# Patient Record
Sex: FEMALE | Race: BLACK OR AFRICAN AMERICAN | Employment: FULL TIME | URBAN - METROPOLITAN AREA
[De-identification: names, ages, dates, MRNs, and addresses within clinical notes are randomized per-mention and may not be internally consistent; named-entity substitution may affect disease eponyms.]

---

## 2017-06-26 ENCOUNTER — OP HISTORICAL/CONVERTED ENCOUNTER (OUTPATIENT)
Dept: OTHER | Age: 32
End: 2017-06-26

## 2018-02-09 ENCOUNTER — OP HISTORICAL/CONVERTED ENCOUNTER (OUTPATIENT)
Dept: OTHER | Age: 33
End: 2018-02-09

## 2021-06-16 RX ORDER — IBUPROFEN 800 MG/1
800 TABLET ORAL
COMMUNITY
End: 2021-07-16

## 2021-06-16 RX ORDER — VALACYCLOVIR HYDROCHLORIDE 500 MG/1
500 TABLET, FILM COATED ORAL DAILY
COMMUNITY

## 2021-06-16 RX ORDER — GABAPENTIN 100 MG/1
100 CAPSULE ORAL AS NEEDED
COMMUNITY

## 2021-06-16 RX ORDER — CETIRIZINE HCL 10 MG
10 TABLET ORAL
COMMUNITY

## 2021-06-16 RX ORDER — NAPROXEN SODIUM 220 MG
220 TABLET ORAL
COMMUNITY

## 2021-06-16 RX ORDER — MONTELUKAST SODIUM 10 MG/1
10 TABLET ORAL EVERY EVENING
COMMUNITY

## 2021-06-16 NOTE — PERIOP NOTES
[address] , [address] [facility]   PRE-ADMISSION TESTING    [phone]     Surgery Date:  7/14/2021      Is surgery arrival time given by surgeon? NO  If NO, [facility] staff will call you between 3 and 7pm the day before your surgery with your arrival time. (If your surgery is on a Monday, we will call you the Friday before.)    Call [phone] after 7pm Monday-Friday if you did not receive this call. INSTRUCTIONS BEFORE YOUR SURGERY   When You  Arrive Arrive at the [address]nd [address] [facility] on the day of your surgery  Have your insurance card, photo ID, and any copayment (if needed)   Food   and   Drink NO food or drink after midnight the night before surgery    This means NO water, gum, mints, coffee, juice, etc.  No alcohol (beer, wine, liquor) 24 hours before and after surgery   Medications to   TAKE   Morning of Surgery MEDICATIONS TO TAKE THE MORNING OF SURGERY WITH A SIP OF WATER:    Zyrtec   Valtrex   Gabapentin if needed     Medications  To  STOP      7 days before surgery  Non-Steroidal anti-inflammatory Drugs (NSAID's): for example, Ibuprofen (Advil, Motrin), Naproxen (Aleve)   Aspirin, if taking for pain    Herbal supplements, vitamins, and fish oil   Other:  (Pain medications not listed above, including Tylenol may be taken)   Blood  Thinners  If you take  Aspirin, Plavix, Coumadin, or any blood-thinning or anti-blood clot medicine, talk to the doctor who prescribed the medications for pre-operative instructions. Bathing Clothing  Jewelry  Valuables      If you shower the morning of surgery, please do not apply anything to your skin (lotions, powders, deodorant, or makeup, especially mascara)   Follow Chlorhexidine Care Fusion body wash instructions provided to you during PAT appointment. Begin 3 days prior to surgery.    Do not shave or trim anywhere 24 hours before surgery   Wear your hair loose or down; no pony-tails, buns, or metal hair clips   Wear loose, comfortable, clean clothes   Wear glasses instead of contacts  [address], valuables, and jewelry, including body piercings, at home   If you were given an [employer], bring it on day of surgery. Going Home - or Spending the Night  SAME-DAY SURGERY: You must have a responsible adult drive you home and stay with you 24 hours after surgery   ADMITS: If your doctor is keeping you in the hospital after surgery, leave personal belongings/luggage in your car until you have a hospital room number. Hospital discharge time is 12 noon  Drivers must be here before 12 noon unless you are told differently   Special Instructions No covid testing needed; please bring your vaccination card with you to the hospital.       Follow all instructions so your surgery wont be cancelled. Please, be on time. If a situation occurs and you are delayed the day of surgery, call [phone]. If your physical condition changes (like a fever, cold, flu, etc.) call your surgeon. Home medication(s) reviewed and verified verbally via phone during PAT appointment. The patient was contacted in person. The patient verbalizes understanding of all instructions and does not need reinforcement.

## 2021-07-13 ENCOUNTER — ANESTHESIA EVENT (OUTPATIENT)
Dept: SURGERY | Age: 36
DRG: 354 | End: 2021-07-13
Payer: COMMERCIAL

## 2021-07-14 ENCOUNTER — HOSPITAL ENCOUNTER (OUTPATIENT)
Age: 36
Setting detail: OBSERVATION
Discharge: HOME | DRG: 354 | End: 2021-07-16
Attending: STUDENT IN AN ORGANIZED HEALTH CARE EDUCATION/TRAINING PROGRAM | Admitting: STUDENT IN AN ORGANIZED HEALTH CARE EDUCATION/TRAINING PROGRAM
Payer: COMMERCIAL

## 2021-07-14 ENCOUNTER — ANESTHESIA (OUTPATIENT)
Dept: SURGERY | Age: 36
DRG: 354 | End: 2021-07-14
Payer: COMMERCIAL

## 2021-07-14 DIAGNOSIS — L76.82 PAIN AT SURGICAL INCISION: Primary | ICD-10-CM

## 2021-07-14 PROBLEM — D25.9 FIBROID UTERUS: Status: ACTIVE | Noted: 2021-07-14

## 2021-07-14 LAB
ABO + RH BLD: NORMAL
ANION GAP SERPL CALC-SCNC: 5 MMOL/L (ref 5–15)
BLOOD GROUP ANTIBODIES SERPL: NORMAL
BUN SERPL-MCNC: 19 MG/DL (ref 6–20)
BUN/CREAT SERPL: 30 (ref 12–20)
CALCIUM SERPL-MCNC: 8.3 MG/DL (ref 8.5–10.1)
CHLORIDE SERPL-SCNC: 110 MMOL/L (ref 97–108)
CO2 SERPL-SCNC: 25 MMOL/L (ref 21–32)
CREAT SERPL-MCNC: 0.64 MG/DL (ref 0.55–1.02)
ERYTHROCYTE [DISTWIDTH] IN BLOOD BY AUTOMATED COUNT: 13.3 % (ref 11.5–14.5)
GLUCOSE SERPL-MCNC: 103 MG/DL (ref 65–100)
HCG UR QL: NEGATIVE
HCT VFR BLD AUTO: 32.6 % (ref 35–47)
HGB BLD-MCNC: 10.2 G/DL (ref 11.5–16)
MCH RBC QN AUTO: 27.4 PG (ref 26–34)
MCHC RBC AUTO-ENTMCNC: 31.3 G/DL (ref 30–36.5)
MCV RBC AUTO: 87.6 FL (ref 80–99)
NRBC # BLD: 0 K/UL (ref 0–0.01)
NRBC BLD-RTO: 0 PER 100 WBC
PLATELET # BLD AUTO: 323 K/UL (ref 150–400)
PMV BLD AUTO: 9.5 FL (ref 8.9–12.9)
POTASSIUM SERPL-SCNC: 3.9 MMOL/L (ref 3.5–5.1)
RBC # BLD AUTO: 3.72 M/UL (ref 3.8–5.2)
SODIUM SERPL-SCNC: 140 MMOL/L (ref 136–145)
SPECIMEN EXP DATE BLD: NORMAL
WBC # BLD AUTO: 8.5 K/UL (ref 3.6–11)

## 2021-07-14 PROCEDURE — 77030031139 HC SUT VCRL2 J&J -A: Performed by: STUDENT IN AN ORGANIZED HEALTH CARE EDUCATION/TRAINING PROGRAM

## 2021-07-14 PROCEDURE — 74011000250 HC RX REV CODE- 250: Performed by: ANESTHESIOLOGY

## 2021-07-14 PROCEDURE — 76210000032 HC AMBSU PH I REC 3 TO 3.5 HR: Performed by: STUDENT IN AN ORGANIZED HEALTH CARE EDUCATION/TRAINING PROGRAM

## 2021-07-14 PROCEDURE — 74011250637 HC RX REV CODE- 250/637: Performed by: STUDENT IN AN ORGANIZED HEALTH CARE EDUCATION/TRAINING PROGRAM

## 2021-07-14 PROCEDURE — 77030035236 HC SUT PDS STRATFX BARB J&J -B: Performed by: STUDENT IN AN ORGANIZED HEALTH CARE EDUCATION/TRAINING PROGRAM

## 2021-07-14 PROCEDURE — 77030002974 HC SUT PLN J&J -A: Performed by: STUDENT IN AN ORGANIZED HEALTH CARE EDUCATION/TRAINING PROGRAM

## 2021-07-14 PROCEDURE — 99218 HC RM OBSERVATION: CPT

## 2021-07-14 PROCEDURE — 77030003601 HC NDL NRV BLK BBMI -A: Performed by: ANESTHESIOLOGY

## 2021-07-14 PROCEDURE — 36415 COLL VENOUS BLD VENIPUNCTURE: CPT

## 2021-07-14 PROCEDURE — 74011250636 HC RX REV CODE- 250/636: Performed by: STUDENT IN AN ORGANIZED HEALTH CARE EDUCATION/TRAINING PROGRAM

## 2021-07-14 PROCEDURE — 77030005513 HC CATH URETH FOL11 MDII -B: Performed by: STUDENT IN AN ORGANIZED HEALTH CARE EDUCATION/TRAINING PROGRAM

## 2021-07-14 PROCEDURE — 80048 BASIC METABOLIC PNL TOTAL CA: CPT

## 2021-07-14 PROCEDURE — 86901 BLOOD TYPING SEROLOGIC RH(D): CPT

## 2021-07-14 PROCEDURE — 2709999900 HC NON-CHARGEABLE SUPPLY: Performed by: STUDENT IN AN ORGANIZED HEALTH CARE EDUCATION/TRAINING PROGRAM

## 2021-07-14 PROCEDURE — 74011000272 HC RX REV CODE- 272: Performed by: STUDENT IN AN ORGANIZED HEALTH CARE EDUCATION/TRAINING PROGRAM

## 2021-07-14 PROCEDURE — 74011000258 HC RX REV CODE- 258: Performed by: STUDENT IN AN ORGANIZED HEALTH CARE EDUCATION/TRAINING PROGRAM

## 2021-07-14 PROCEDURE — 76030000004 HC AMB SURG OR TIME 2 TO 2.5: Performed by: STUDENT IN AN ORGANIZED HEALTH CARE EDUCATION/TRAINING PROGRAM

## 2021-07-14 PROCEDURE — 74011250636 HC RX REV CODE- 250/636: Performed by: ANESTHESIOLOGY

## 2021-07-14 PROCEDURE — 76060000064 HC AMB SURG ANES 2 TO 2.5 HR: Performed by: STUDENT IN AN ORGANIZED HEALTH CARE EDUCATION/TRAINING PROGRAM

## 2021-07-14 PROCEDURE — 81025 URINE PREGNANCY TEST: CPT

## 2021-07-14 PROCEDURE — 77030002933 HC SUT MCRYL J&J -A: Performed by: STUDENT IN AN ORGANIZED HEALTH CARE EDUCATION/TRAINING PROGRAM

## 2021-07-14 PROCEDURE — 74011000258 HC RX REV CODE- 258: Performed by: ANESTHESIOLOGY

## 2021-07-14 PROCEDURE — C1765 ADHESION BARRIER: HCPCS | Performed by: STUDENT IN AN ORGANIZED HEALTH CARE EDUCATION/TRAINING PROGRAM

## 2021-07-14 PROCEDURE — 85027 COMPLETE CBC AUTOMATED: CPT

## 2021-07-14 PROCEDURE — 88305 TISSUE EXAM BY PATHOLOGIST: CPT

## 2021-07-14 PROCEDURE — 77030027138 HC INCENT SPIROMETER -A

## 2021-07-14 RX ORDER — PROPOFOL 10 MG/ML
INJECTION, EMULSION INTRAVENOUS AS NEEDED
Status: DISCONTINUED | OUTPATIENT
Start: 2021-07-14 | End: 2021-07-14 | Stop reason: HOSPADM

## 2021-07-14 RX ORDER — HYDROMORPHONE HYDROCHLORIDE 1 MG/ML
1 INJECTION, SOLUTION INTRAMUSCULAR; INTRAVENOUS; SUBCUTANEOUS
Status: DISCONTINUED | OUTPATIENT
Start: 2021-07-14 | End: 2021-07-16 | Stop reason: HOSPADM

## 2021-07-14 RX ORDER — ROPIVACAINE HYDROCHLORIDE 2 MG/ML
INJECTION, SOLUTION EPIDURAL; INFILTRATION; PERINEURAL AS NEEDED
Status: DISCONTINUED | OUTPATIENT
Start: 2021-07-14 | End: 2021-07-14 | Stop reason: HOSPADM

## 2021-07-14 RX ORDER — DEXAMETHASONE SODIUM PHOSPHATE 4 MG/ML
INJECTION, SOLUTION INTRA-ARTICULAR; INTRALESIONAL; INTRAMUSCULAR; INTRAVENOUS; SOFT TISSUE AS NEEDED
Status: DISCONTINUED | OUTPATIENT
Start: 2021-07-14 | End: 2021-07-14 | Stop reason: HOSPADM

## 2021-07-14 RX ORDER — SODIUM CHLORIDE 0.9 % (FLUSH) 0.9 %
5-40 SYRINGE (ML) INJECTION EVERY 8 HOURS
Status: DISCONTINUED | OUTPATIENT
Start: 2021-07-14 | End: 2021-07-14 | Stop reason: HOSPADM

## 2021-07-14 RX ORDER — CLINDAMYCIN PHOSPHATE 900 MG/50ML
900 INJECTION, SOLUTION INTRAVENOUS ONCE
Status: COMPLETED | OUTPATIENT
Start: 2021-07-14 | End: 2021-07-14

## 2021-07-14 RX ORDER — KETOROLAC TROMETHAMINE 30 MG/ML
30 INJECTION, SOLUTION INTRAMUSCULAR; INTRAVENOUS EVERY 6 HOURS
Status: DISCONTINUED | OUTPATIENT
Start: 2021-07-14 | End: 2021-07-15

## 2021-07-14 RX ORDER — GLYCOPYRROLATE 0.2 MG/ML
INJECTION INTRAMUSCULAR; INTRAVENOUS AS NEEDED
Status: DISCONTINUED | OUTPATIENT
Start: 2021-07-14 | End: 2021-07-14 | Stop reason: HOSPADM

## 2021-07-14 RX ORDER — LIDOCAINE HYDROCHLORIDE 10 MG/ML
0.1 INJECTION, SOLUTION EPIDURAL; INFILTRATION; INTRACAUDAL; PERINEURAL AS NEEDED
Status: DISCONTINUED | OUTPATIENT
Start: 2021-07-14 | End: 2021-07-14 | Stop reason: HOSPADM

## 2021-07-14 RX ORDER — ROCURONIUM BROMIDE 10 MG/ML
INJECTION, SOLUTION INTRAVENOUS AS NEEDED
Status: DISCONTINUED | OUTPATIENT
Start: 2021-07-14 | End: 2021-07-14 | Stop reason: HOSPADM

## 2021-07-14 RX ORDER — FENTANYL CITRATE 50 UG/ML
INJECTION, SOLUTION INTRAMUSCULAR; INTRAVENOUS AS NEEDED
Status: DISCONTINUED | OUTPATIENT
Start: 2021-07-14 | End: 2021-07-14 | Stop reason: HOSPADM

## 2021-07-14 RX ORDER — SODIUM CHLORIDE, SODIUM LACTATE, POTASSIUM CHLORIDE, CALCIUM CHLORIDE 600; 310; 30; 20 MG/100ML; MG/100ML; MG/100ML; MG/100ML
125 INJECTION, SOLUTION INTRAVENOUS CONTINUOUS
Status: DISCONTINUED | OUTPATIENT
Start: 2021-07-14 | End: 2021-07-14 | Stop reason: HOSPADM

## 2021-07-14 RX ORDER — DIPHENHYDRAMINE HYDROCHLORIDE 50 MG/ML
12.5 INJECTION, SOLUTION INTRAMUSCULAR; INTRAVENOUS
Status: DISCONTINUED | OUTPATIENT
Start: 2021-07-14 | End: 2021-07-16 | Stop reason: HOSPADM

## 2021-07-14 RX ORDER — SODIUM CHLORIDE, SODIUM LACTATE, POTASSIUM CHLORIDE, CALCIUM CHLORIDE 600; 310; 30; 20 MG/100ML; MG/100ML; MG/100ML; MG/100ML
125 INJECTION, SOLUTION INTRAVENOUS CONTINUOUS
Status: DISCONTINUED | OUTPATIENT
Start: 2021-07-14 | End: 2021-07-15

## 2021-07-14 RX ORDER — ONDANSETRON 2 MG/ML
4 INJECTION INTRAMUSCULAR; INTRAVENOUS AS NEEDED
Status: DISCONTINUED | OUTPATIENT
Start: 2021-07-14 | End: 2021-07-14 | Stop reason: HOSPADM

## 2021-07-14 RX ORDER — HYDROMORPHONE HYDROCHLORIDE 1 MG/ML
.5-1 INJECTION, SOLUTION INTRAMUSCULAR; INTRAVENOUS; SUBCUTANEOUS
Status: DISCONTINUED | OUTPATIENT
Start: 2021-07-14 | End: 2021-07-14 | Stop reason: HOSPADM

## 2021-07-14 RX ORDER — NEOSTIGMINE METHYLSULFATE 1 MG/ML
INJECTION, SOLUTION INTRAVENOUS AS NEEDED
Status: DISCONTINUED | OUTPATIENT
Start: 2021-07-14 | End: 2021-07-14 | Stop reason: HOSPADM

## 2021-07-14 RX ORDER — KETOROLAC TROMETHAMINE 30 MG/ML
INJECTION, SOLUTION INTRAMUSCULAR; INTRAVENOUS AS NEEDED
Status: DISCONTINUED | OUTPATIENT
Start: 2021-07-14 | End: 2021-07-14 | Stop reason: HOSPADM

## 2021-07-14 RX ORDER — DOCUSATE SODIUM 100 MG/1
100 CAPSULE, LIQUID FILLED ORAL 2 TIMES DAILY
Status: DISCONTINUED | OUTPATIENT
Start: 2021-07-14 | End: 2021-07-16 | Stop reason: HOSPADM

## 2021-07-14 RX ORDER — TRANEXAMIC ACID 100 MG/ML
1000 INJECTION, SOLUTION INTRAVENOUS ONCE
Status: DISCONTINUED | OUTPATIENT
Start: 2021-07-14 | End: 2021-07-14 | Stop reason: HOSPADM

## 2021-07-14 RX ORDER — ONDANSETRON 2 MG/ML
4 INJECTION INTRAMUSCULAR; INTRAVENOUS
Status: DISCONTINUED | OUTPATIENT
Start: 2021-07-14 | End: 2021-07-16 | Stop reason: HOSPADM

## 2021-07-14 RX ORDER — LIDOCAINE HYDROCHLORIDE 20 MG/ML
INJECTION, SOLUTION EPIDURAL; INFILTRATION; INTRACAUDAL; PERINEURAL AS NEEDED
Status: DISCONTINUED | OUTPATIENT
Start: 2021-07-14 | End: 2021-07-14 | Stop reason: HOSPADM

## 2021-07-14 RX ORDER — SODIUM CHLORIDE 0.9 % (FLUSH) 0.9 %
5-40 SYRINGE (ML) INJECTION AS NEEDED
Status: DISCONTINUED | OUTPATIENT
Start: 2021-07-14 | End: 2021-07-14 | Stop reason: HOSPADM

## 2021-07-14 RX ORDER — OXYCODONE AND ACETAMINOPHEN 5; 325 MG/1; MG/1
1 TABLET ORAL
Status: DISCONTINUED | OUTPATIENT
Start: 2021-07-14 | End: 2021-07-16 | Stop reason: HOSPADM

## 2021-07-14 RX ORDER — MIDAZOLAM HYDROCHLORIDE 1 MG/ML
INJECTION, SOLUTION INTRAMUSCULAR; INTRAVENOUS AS NEEDED
Status: DISCONTINUED | OUTPATIENT
Start: 2021-07-14 | End: 2021-07-14 | Stop reason: HOSPADM

## 2021-07-14 RX ORDER — SODIUM CHLORIDE 0.9 % (FLUSH) 0.9 %
5-40 SYRINGE (ML) INJECTION EVERY 8 HOURS
Status: DISCONTINUED | OUTPATIENT
Start: 2021-07-14 | End: 2021-07-16 | Stop reason: HOSPADM

## 2021-07-14 RX ORDER — SODIUM CHLORIDE 0.9 % (FLUSH) 0.9 %
5-40 SYRINGE (ML) INJECTION AS NEEDED
Status: DISCONTINUED | OUTPATIENT
Start: 2021-07-14 | End: 2021-07-16 | Stop reason: HOSPADM

## 2021-07-14 RX ORDER — ONDANSETRON 2 MG/ML
INJECTION INTRAMUSCULAR; INTRAVENOUS AS NEEDED
Status: DISCONTINUED | OUTPATIENT
Start: 2021-07-14 | End: 2021-07-14 | Stop reason: HOSPADM

## 2021-07-14 RX ADMIN — FENTANYL CITRATE 100 MCG: 50 INJECTION, SOLUTION INTRAMUSCULAR; INTRAVENOUS at 08:43

## 2021-07-14 RX ADMIN — CLINDAMYCIN PHOSPHATE 900 MG: 900 INJECTION, SOLUTION INTRAVENOUS at 08:54

## 2021-07-14 RX ADMIN — Medication 10 ML: at 21:29

## 2021-07-14 RX ADMIN — LIDOCAINE HYDROCHLORIDE 100 MG: 20 INJECTION, SOLUTION INTRAVENOUS at 08:43

## 2021-07-14 RX ADMIN — SODIUM CHLORIDE, POTASSIUM CHLORIDE, SODIUM LACTATE AND CALCIUM CHLORIDE: 600; 310; 30; 20 INJECTION, SOLUTION INTRAVENOUS at 09:26

## 2021-07-14 RX ADMIN — HYDROMORPHONE HYDROCHLORIDE 1 MG: 1 INJECTION, SOLUTION INTRAMUSCULAR; INTRAVENOUS; SUBCUTANEOUS at 17:01

## 2021-07-14 RX ADMIN — ROPIVACAINE HYDROCHLORIDE 20 ML: 2 INJECTION EPIDURAL; INFILTRATION at 10:13

## 2021-07-14 RX ADMIN — KETOROLAC TROMETHAMINE 30 MG: 30 INJECTION, SOLUTION INTRAMUSCULAR; INTRAVENOUS at 09:53

## 2021-07-14 RX ADMIN — HYDROMORPHONE HYDROCHLORIDE 1 MG: 1 INJECTION, SOLUTION INTRAMUSCULAR; INTRAVENOUS; SUBCUTANEOUS at 12:39

## 2021-07-14 RX ADMIN — FENTANYL CITRATE 50 MCG: 50 INJECTION, SOLUTION INTRAMUSCULAR; INTRAVENOUS at 08:58

## 2021-07-14 RX ADMIN — KETOROLAC TROMETHAMINE 30 MG: 30 INJECTION, SOLUTION INTRAMUSCULAR; INTRAVENOUS at 23:47

## 2021-07-14 RX ADMIN — FENTANYL CITRATE 50 MCG: 50 INJECTION, SOLUTION INTRAMUSCULAR; INTRAVENOUS at 09:55

## 2021-07-14 RX ADMIN — PROPOFOL 150 MCG/KG/MIN: 10 INJECTION, EMULSION INTRAVENOUS at 08:50

## 2021-07-14 RX ADMIN — DOCUSATE SODIUM 100 MG: 100 CAPSULE ORAL at 17:01

## 2021-07-14 RX ADMIN — HYDROMORPHONE HYDROCHLORIDE 1 MG: 1 INJECTION, SOLUTION INTRAMUSCULAR; INTRAVENOUS; SUBCUTANEOUS at 21:29

## 2021-07-14 RX ADMIN — DEXAMETHASONE SODIUM PHOSPHATE 8 MG: 4 INJECTION, SOLUTION INTRAMUSCULAR; INTRAVENOUS at 08:54

## 2021-07-14 RX ADMIN — SODIUM CHLORIDE, POTASSIUM CHLORIDE, SODIUM LACTATE AND CALCIUM CHLORIDE 125 ML/HR: 600; 310; 30; 20 INJECTION, SOLUTION INTRAVENOUS at 14:54

## 2021-07-14 RX ADMIN — GLYCOPYRROLATE 1 MG: 0.2 INJECTION INTRAMUSCULAR; INTRAVENOUS at 10:06

## 2021-07-14 RX ADMIN — ROCURONIUM BROMIDE 50 MG: 10 INJECTION INTRAVENOUS at 08:43

## 2021-07-14 RX ADMIN — SODIUM CHLORIDE, POTASSIUM CHLORIDE, SODIUM LACTATE AND CALCIUM CHLORIDE: 600; 310; 30; 20 INJECTION, SOLUTION INTRAVENOUS at 10:28

## 2021-07-14 RX ADMIN — KETOROLAC TROMETHAMINE 30 MG: 30 INJECTION, SOLUTION INTRAMUSCULAR; INTRAVENOUS at 17:01

## 2021-07-14 RX ADMIN — HYDROMORPHONE HYDROCHLORIDE 0.5 MG: 1 INJECTION, SOLUTION INTRAMUSCULAR; INTRAVENOUS; SUBCUTANEOUS at 11:05

## 2021-07-14 RX ADMIN — GENTAMICIN SULFATE 400 MG: 40 INJECTION, SOLUTION INTRAMUSCULAR; INTRAVENOUS at 08:01

## 2021-07-14 RX ADMIN — HYDROMORPHONE HYDROCHLORIDE 0.5 MG: 1 INJECTION, SOLUTION INTRAMUSCULAR; INTRAVENOUS; SUBCUTANEOUS at 11:28

## 2021-07-14 RX ADMIN — PROPOFOL 200 MG: 10 INJECTION, EMULSION INTRAVENOUS at 08:43

## 2021-07-14 RX ADMIN — OXYCODONE HYDROCHLORIDE AND ACETAMINOPHEN 1 TABLET: 5; 325 TABLET ORAL at 14:54

## 2021-07-14 RX ADMIN — MIDAZOLAM 2 MG: 1 INJECTION, SOLUTION INTRAMUSCULAR; INTRAVENOUS at 08:35

## 2021-07-14 RX ADMIN — TRANEXAMIC ACID 1 G: 100 INJECTION, SOLUTION INTRAVENOUS at 09:53

## 2021-07-14 RX ADMIN — OXYCODONE HYDROCHLORIDE AND ACETAMINOPHEN 1 TABLET: 5; 325 TABLET ORAL at 19:34

## 2021-07-14 RX ADMIN — Medication 10 ML: at 15:00

## 2021-07-14 RX ADMIN — FENTANYL CITRATE 50 MCG: 50 INJECTION, SOLUTION INTRAMUSCULAR; INTRAVENOUS at 08:51

## 2021-07-14 RX ADMIN — FENTANYL CITRATE 50 MCG: 50 INJECTION, SOLUTION INTRAMUSCULAR; INTRAVENOUS at 08:35

## 2021-07-14 RX ADMIN — ONDANSETRON HYDROCHLORIDE 4 MG: 2 SOLUTION INTRAMUSCULAR; INTRAVENOUS at 09:53

## 2021-07-14 RX ADMIN — OXYCODONE HYDROCHLORIDE AND ACETAMINOPHEN 1 TABLET: 5; 325 TABLET ORAL at 23:47

## 2021-07-14 RX ADMIN — SODIUM CHLORIDE, POTASSIUM CHLORIDE, SODIUM LACTATE AND CALCIUM CHLORIDE: 600; 310; 30; 20 INJECTION, SOLUTION INTRAVENOUS at 07:35

## 2021-07-14 RX ADMIN — SODIUM CHLORIDE, POTASSIUM CHLORIDE, SODIUM LACTATE AND CALCIUM CHLORIDE 125 ML/HR: 600; 310; 30; 20 INJECTION, SOLUTION INTRAVENOUS at 07:34

## 2021-07-14 RX ADMIN — Medication 5 MG: at 10:06

## 2021-07-14 RX ADMIN — ROPIVACAINE HYDROCHLORIDE 20 ML: 2 INJECTION EPIDURAL; INFILTRATION at 10:18

## 2021-07-14 RX ADMIN — GENTAMICIN SULFATE 400 MG: 40 INJECTION, SOLUTION INTRAMUSCULAR; INTRAVENOUS at 08:47

## 2021-07-14 NOTE — OP NOTES
[facility]  OPERATIVE REPORT    Name:  Juan Daniel Muro  MR#:  [id number]  :  1985  ACCOUNT #:  [de-identified]  DATE OF SERVICE:  2021    PREOPERATIVE DIAGNOSES:  A 43-year-old  2, para 1, aborta 1 who has been evaluated in the office for abnormal uterine bleeding, fibroids and pelvic pain. POSTOPERATIVE DIAGNOSES:  A 43-year-old  2, para 1, aborta 1 who has been evaluated in the office for abnormal uterine bleeding, fibroids and pelvic pain. PROCEDURE PERFORMED:  Abdominal myomectomy. SURGEON:  Eugene Rain DO    ASSISTANT:  Laren Severin, DO    ANESTHESIA:  General anesthesia. COMPLICATIONS:  None. SPECIMENS REMOVED:  Fibroids. IMPLANTS:  None. ESTIMATED BLOOD LOSS:  100 mL. URINE OUTPUT:  200 mL. IV FLUIDS:  1900 mL of crystalloids. DRAINS:  Tucker catheter to gravity. FINDINGS:  Uterus was 7 cm fundal fibroid. Normal fallopian tubes and ovaries. INDICATIONS:  The patient presented to the office with complaint of heavy irregular bleeding and pelvic pressure and pelvic pain over the last 6 to 8 months. She had an ultrasound done which showed a 7 cm fundal fibroid that may impinge upon endometrial cavity. Her bleeding started to improve with Depo-Provera, however, she expressed desire for future fertility and wanting to consider pregnancy in the next couple of years and would like to proceed with having the fibroids removed. Risks, benefits and alternatives were reviewed and all questions were answered prior to going to the operating room. PROCEDURE:  The patient was taken to the operating room. After adequate anesthesia was achieved via general endotracheal anesthesia, her abdomen prepped and she was draped in sterile fashion. Tucker catheter had been placed in her bladder. A Pfannenstiel incision was made 2 cm above the pubic symphysis.   This incision was carried down sharply through the level of the fascia. The fascia was incised and the fascial opening extended using Guerrero scissors. Superior border of the fascia was grasped with Kocher clamps. The muscles were bluntly dissected off. Cautery was used where needed. The anterior border of the fascia was dissected off in a similar manner down to the level of the pubic symphysis. The rectus muscles were  bluntly in the midline. The peritoneum was identified and entered bluntly. Peritoneal opening was then extended using the Bovie. An Dangelo retractor was placed. Wet laps were placed to pack away the bowel. The uterus was elevated to the incision and inspected. A large fibroid approximately 7 cm comprising most of the size of the uterus was present. The serosa overlying the fibroid was injected with a mixture of vasopressin and normal saline. An incision was then made over the fibroid with the Bovie and then the fibroid was shelled out in its entirety. The uterine defect which did enter the uterine cavity was closed using a 2-layer closure of #1 barbed Stratafix suture followed by closure of the serosa using a 4-0 Monocryl. An additional figure-of-eight suture of 3-0 Monocryl was placed at the superior aspect of the incision. Hemostasis was excellent. Interceed was applied over the incision. The wet laps were removed as well as the retractor. The peritoneum was identified and closed using 2-0 Vicryl suture. The rectus fascia was then closed in a running fashion with a #1 Vicryl suture. The subcutaneous tissues were irrigated. Hemostasis was achieved with the Bovie where needed. The subcutaneous tissue was closed using a 2-0 plain gut suture in a running fashion followed by closure of the skin using 4-0 Monocryl in a subcuticular fashion. The incision was bandaged with Steri-Strips and a pressure dressing was applied. All counts were correct at the end of the case and there were no complications.       John Contreras       [id number]  D:  07/14/2021 14:28  T:  07/14/2021 19:14  JOB #:  [id number]  CC:  Vielka Brown DO

## 2021-07-14 NOTE — ANESTHESIA PROCEDURE NOTES
Peripheral Block    Start time: 7/14/2021 10:05 AM  End time: 7/14/2021 10:12 AM  Performed by: Lyle Parson MD  Authorized by: Lyle Parson MD       Pre-procedure:    Indications: post-op pain management    Preanesthetic Checklist: patient identified, risks and benefits discussed, site marked, timeout performed, anesthesia consent given and patient being monitored    Timeout Time: 10:05 EDT          Block Type:   Block Type:  TAP  Laterality:  Left and right  Monitoring:  Standard ASA monitoring, continuous pulse ox, frequent vital sign checks, heart rate, responsive to questions and oxygen  Injection Technique:  Single shot  Procedures: ultrasound guided    Patient Position: supine  Prep: chlorhexidine    Location:  Abdominal  Needle Type:  Stimuplex  Needle Gauge:  20 G  Needle Localization:  Infiltration and ultrasound guidance    Assessment:    Injection Assessment:  Incremental injection every 5 mL, negative aspiration for CSF, no paresthesia, local visualized surrounding nerve on ultrasound, negative aspiration for blood and no intravascular symptoms  Patient tolerance:  Patient tolerated the procedure well with no immediate complications

## 2021-07-14 NOTE — ANESTHESIA PREPROCEDURE EVALUATION
Relevant Problems   No relevant active problems       Anesthetic History   No history of anesthetic complications            Review of Systems / Medical History  Patient summary reviewed and pertinent labs reviewed    Pulmonary  Within defined limits                 Neuro/Psych   Within defined limits           Cardiovascular  Within defined limits                     GI/Hepatic/Renal  Within defined limits              Endo/Other        Obesity and anemia     Other Findings   Comments: Uterine fibroids  Hct 32         Physical Exam    Airway  Mallampati: II    Neck ROM: normal range of motion   Mouth opening: Normal     Cardiovascular    Rhythm: regular  Rate: normal         Dental    Dentition: Lower dentition intact and Upper dentition intact     Pulmonary  Breath sounds clear to auscultation               Abdominal  GI exam deferred       Other Findings            Anesthetic Plan    ASA: 2  Anesthesia type: general      Post-op pain plan if not by surgeon: regional    Induction: Intravenous  Anesthetic plan and risks discussed with: Patient      Discussed TAP block with patient for postop pain control. Will discuss with Dr. Sharonda Mojica and will plan to perform block at the end of the case.

## 2021-07-14 NOTE — ANESTHESIA PROCEDURE NOTES
Peripheral Block    Performed by: Von Farrell MD  Authorized by: Von Farrell MD       Block Type:     Assessment:    Injection Assessment:

## 2021-07-14 NOTE — DISCHARGE INSTRUCTIONS
Abdominal Myomectomy Discharge Instructions    Patient ID:  Leny Lester  [id number]  39 y.o.  1985    Take Home Medications       What to do at [address]: AS TOLERATED                                    AVOID [address]    Recommended activity: GRADUALLY RESUME NORMAL ACTIVITIES OVER 4 WEEKS, LIMIT STAIRS AND LIFTING  NO DRIVING FOR 2 WEEKS  NOTHING IN VAGINA FOR 4 WEEKS      Call your doctor if you experience any of the following symptoms. FEVER OR CHILLS  HEAVY VAGINAL DRAINAGE OR SMELLY DISCHARGE  REDNESS, BLEEDING OR DISCHARGE AT INCISION SITE  PAIN OR SWELLING IN YOU LEGS    Follow-up with Dr. Florecita Caldera in 2 weeks. Abdominal Myomectomy: What to Expect at [address] can expect to feel better and stronger each day, although you may need pain medicine for a week or two. You may get tired easily or have less energy than usual. This may last for several weeks after surgery. You will probably notice that your belly is swollen and puffy. This is common. The swelling will take several weeks to go down. You may take 6 to 8 weeks to fully recover. It is important to avoid lifting while you are recovering so that you can heal.    This care sheet gives you a general idea about how long it will take for you to recover. But each person recovers at a different pace. Follow the steps below to get better as quickly as possible. How can you care for yourself at home? Activity  Rest when you feel tired. Getting enough sleep will help you recover. Try to walk each day. Start by walking a little more than you did the day before. Bit by bit, increase the amount you walk. Walking boosts blood flow and helps prevent pneumonia and constipation. Avoid lifting anything that would make you strain.  This may include heavy grocery bags and milk containers, a heavy briefcase or backpack, cat litter or dog food bags, a child, or a vacuum . Avoid strenuous activities, such as biking, jogging, weight lifting, or aerobic exercise, until your doctor says it is okay. You may shower. Pat the cut (incision) dry. Do not take a bath for the first 2 weeks, or until your doctor tells you it is okay. You may drive when you are no longer taking prescription pain medicine and can quickly move your foot from the gas pedal to the brake. You must also be able to sit comfortably for a long period of time, even if you do not plan to go far. You might get caught in traffic. You will probably need to take 2 to 4 weeks off from work. It depends on the type of work you do and how you feel. Your doctor will tell you when you can have sex again. Diet  You can eat your normal diet. If your stomach is upset, try bland, low-fat foods like plain rice, broiled chicken, toast, and yogurt. Drink plenty of fluids (unless your doctor tells you not to). You may notice that your bowel movements are not regular right after your surgery. This is common. Try to avoid constipation and straining with bowel movements. You may want to take a fiber supplement every day. If you have not had a bowel movement after a couple of days, ask your doctor about taking a mild laxative. Medicines  Take pain medicines exactly as directed. If the doctor gave you a prescription medicine for pain, take it as prescribed. If you are not taking a prescription pain medicine, take an over-the-counter medicine such as acetaminophen (Tylenol), ibuprofen (Advil, Motrin), or naproxen (Aleve). Read and follow all instructions on the label. Do not take two or more pain medicines at the same time unless the doctor told you to. Many pain medicines have acetaminophen, which is Tylenol. Too much Tylenol can be harmful. If your doctor prescribed antibiotics, take them as directed. Do not stop taking them just because you feel better. You need to take the full course of antibiotics.   If you think your pain medicine is making you sick to your stomach: Take your medicine after meals (unless your doctor has told you not to). Ask your doctor for a different pain medicine. Incision care  If you have strips of tape on the cut (incision) the doctor made, leave the tape on for a week and then remove them. Wash the area daily with warm, soapy water, and pat it dry. Other cleaning products, such as hydrogen peroxide, can make the wound heal more slowly. You may cover the area with a gauze bandage if it weeps or rubs against clothing. Change the bandage every day. Keep the area clean and dry. If necessary, you may dry the incision with a hair dryer on a cool setting after showering. Other instructions  You may have some light vaginal bleeding. Wear sanitary pads if needed. Do not douche or use tampons. Follow-up care is a key part of your treatment and safety. Be sure to make and go to all appointments, and call your doctor if you are having problems. It's also a good idea to know your test results and keep a list of the medicines you take. When should you call for help? Call 911 anytime you think you may need emergency care. For example, call if:  You pass out (lose consciousness). You have sudden chest pain and shortness of breath, or you cough up blood. You have severe pain in your belly. Call your doctor now or seek immediate medical care if:  You have bright red vaginal bleeding that soaks one or more pads in an hour, or you have large clots. You have foul-smelling discharge from your vagina. You are sick to your stomach or cannot keep fluids down. You have signs of infection, such as: Increased pain, swelling, warmth, or redness. Red streaks leading from the incision. Pus draining from the incision. Swollen lymph nodes in your neck, armpits, or groin. A fever. You have pain that does not get better after you take pain medicine.   You have loose stitches, or your incision comes open. You have signs of a blood clot, such as:  Pain in your calf, back of knee, thigh, or groin. Redness and swelling in your leg or groin. You have trouble passing urine or stool, especially if you have pain or swelling in your lower belly. You have hot flashes, sweating, flushing, or a fast or pounding heartbeat. Watch closely for changes in your health, and be sure to contact your doctor if:  You do not have a bowel movement after taking a laxative.

## 2021-07-14 NOTE — ANESTHESIA POSTPROCEDURE EVALUATION
Procedure(s):  ABDOMINAL MYOMECTOMY.     general    Anesthesia Post Evaluation        Patient location during evaluation: PACU  Level of consciousness: awake  Pain management: adequate  Airway patency: patent  Anesthetic complications: no  Cardiovascular status: acceptable  Respiratory status: acceptable  Hydration status: acceptable  Post anesthesia nausea and vomiting:  none      INITIAL Post-op Vital signs:   Vitals Value Taken Time   /81 07/14/21 1340   Temp 36.7 °C (98.1 °F) 07/14/21 1043   Pulse 81 07/14/21 1345   Resp 22 07/14/21 1345   SpO2 97 % 07/14/21 1345

## 2021-07-14 NOTE — BRIEF OP NOTE
Brief Postoperative Note    Patient: Vielka Sommers  YOB: 1985  MRN: [id number]    Date of Procedure: 7/14/2021     Pre-Op Diagnosis: FIBROID, PELVIC PAIN    Post-Op Diagnosis: Same as preoperative diagnosis.       Procedure(s):  ABDOMINAL MYOMECTOMY    Surgeon(s):  DO Niurka Musa DO    Surgical Assistant: Dr. Zee Quinonez    Anesthesia: General     Estimated Blood Loss (mL): 100cc, UOP 200cc, IVF 1900cc crystalloid     Complications: None    Specimens:   ID Type Source Tests Collected by Time Destination   1 : Fibroid Preservative Uterus  Bonilla Oviedo DO 7/14/2021 0934 Pathology      Implants: none    Drains: cline to gravity     Findings: Uterus with 7cm fundal fibroid, normal fallopian tubes and ovaries     Electronically Signed by Darwin Harrell DO on 7/14/2021 at 10:15 AM

## 2021-07-14 NOTE — PERIOP NOTES
4th floor Charge RN notified of admission/pending return and awaiting receiving ( tba  RN) call-back for assignment to/return to room 423.

## 2021-07-14 NOTE — PERIOP NOTES
PACU IN REPORT FROM ANESTHESIA    Verbal report received from   Mariposa Mtz   [x] MD/-Anesthesiologist    [] CRNA   [x] with student    CHOICE ANESTHESIA:  [x] GENERAL  [] TIVA  [] MAC  [] LOCAL  [x] REGIONAL  [] SPINAL   [] EPIDURAL   **Note the anesthesia record for medications given intraoperatively. **           [] E.R.A.S. PROTOCOL    SURGICAL PROCEDURE: Procedure(s) (LRB):  ABDOMINAL MYOMECTOMY (N/A) - Post-Op TAP [employer] before arrival to PACU    SURGEON: Raquel Solo DO.    Brief Initial Visual Assessment:    Patient Age: [] Infant(1-12mo)      []Pediatric(1-13yrs)    [] Adolescent(13-18yrs)    [x] Adult(18-65yrs)      []Geriatric Adult(>65yrs). Patient    [] Alert           []Calm & Cooperative      [] Anxious  Appearance: [] Drowsy      [x] Sedated      [x] Unresponsive     Oriented x  0            Airway:     [x] Patent                          [] Obstructs easily/Obstructed on arrival   [] \"Difficult Airway\" report by Anesthesia                        [] Airway improved with head/airway repositioning                       Airway Adjuncts Present: [] Oral Airway    [] Nasal Trumpet    [] ETT    [] LMA            Respiratory  [x] Even   [] Labored   [] Shallow   [] Tachypnea   [] Bradypnea  Pattern:    [x] Non-Labored  [] VENT and/or respiratory assistance     being provided. Skin:     [x] Pink [] Dusky    [] Pale        [x] Warm    [] Hot [] Cool       [] Cold   [x]Dry [] Moist [] Diaphoretic     Membranes:  [x] Pink [] Pale       [x] Moist [] Dry     [] Crusty     Pain:   [x] No Acute Discomfort. 0  /10 Scale [] Verbal Numeric   [] Moderate Discomfort.      [] V.A.S. [] Acute Discomfort. [x] A.N.V.    [] Chronic-Issue Related Discomfort.   [] F.L.A.C.C. Note E-MAR for medications administered. []Faces, Woodruff/Baker    Note assessments documented in flowsheets; any assessment variants to be found in comments or narrative perioperative nurse notes. Post-anesthesia care now assumed by [facility] BSN, RN-BC

## 2021-07-14 NOTE — PERIOP NOTES
POST ANESTHESIA CARE    DISCHARGE / TRANSFER NOTE    Jose Downs was   transferred   via   Bed   to   hospital room 423   . Patient was escorted by  nurse   . Patient verbalized   appreciation and was very pleased with care received  throughout their stay. Patient was discharged in     pleasant mood  . Pain at discharge/transfer was      4  /10. Discharge, medication and follow-up instructions were verbalized as understood prior to discharge  (if applicable for same-day procedures being discharged.)    All personal belongings have been returned to patient, and patient/family verbally confirm receiving belongings as all present. TRANSFER - OUT REPORT:    VERBAL-BEDSIDE   report  WAS     given at 2:10 PM to   Marycruz PAGE  receiving   Jose Downs   and being transferred to     [facility]/S    for routine post - op  Following General for Procedure(s) (LRB):  ABDOMINAL MYOMECTOMY (N/A) by  Shawn Carlos DO. Patient Situation, Background, Assessment and Recommendations (SBAR) was provided and included information from the following SBAR report(s) (SBAR, OR Summary and MAR) which were reviewed with the receiving nurse. Lines:   Peripheral IV 07/14/21 Left;Posterior Hand (Active)   Site Assessment Clean, dry, & intact 07/14/21 1040   Phlebitis Assessment 0 07/14/21 1040   Infiltration Assessment 0 07/14/21 1040   Dressing Status Clean, dry, & intact 07/14/21 1040   Dressing Type Transparent;Tape 07/14/21 1040   Hub Color/Line Status Patent; Flushed; Infusing 07/14/21 1040   Action Taken Open ports on tubing capped 07/14/21 1040      Also Reviewed (checked if applicable):   [] NO ADDITIONAL REVIEWS  [] PCA Drug and Settings     [] Cold Therapy with extra gels/Ice Pack     [] On-Q @  ml/hr   [x] Drains x 1 (cline)      [] Significant Wound care/devices (e.g. Wound vac, etc.)     [] Holding pt in PACU awaiting bed availability - additional care provided and eMAR review  [] Vent Settings      [] Critical Care Drips  Contact Precautions: There are currently no Active Isolations  Patient transported with:  Registered Nurse    Additional Information: Interval bedside SBAR report was completed at handoff. There were:  [x] No changes to patient condition since last assessment and/or communication with receiving RN.    [] There were changes to patient care/condition since last communication with receiving RN and were reviewed at        verbal bedside SBAR change of staff. Patient is in:   [x] No Acute Distress     [] Mild/Moderate Acute discomfort - treated and controlled  [] Acute dicomfort/distress - treated but still not fully controlled  [] Acute dicomfort/distress reported, however maximum allowable dosing has been achieved and no further        doses allowed    Vital Signs are: [x] Stable - consistent with end of PACU care. [] Unstable -  receiving continued care in appropriate setting    [] 2 RN skin check completed with receiving RN. [x] Spouse/family/caregiver at bedside during/after staff handoff of care. - Discussed transfer and update provided to mother at [phone]  [] No Spouse/family/caregiver with the patient at this time. After report, opportunity for questions and clarification was provided.     Signed: Esperanza BENITEZN RN-BC

## 2021-07-14 NOTE — H&P
Day of surgery H&P Update     Pt seen and examined. No interval changes. Please see paper H&P from the office on 7/7/21. Diagnosis is abnormal uterine bleeding, uterine fibroids, pelvic pain. Planned procedure is abdominal myomectomy. Consents reviewed and signed and all questions answered. SCDs for DVT PPx. ABX PPx [facility]. Proceed to OR.      Ciara Rome DO

## 2021-07-15 PROCEDURE — 99218 HC RM OBSERVATION: CPT

## 2021-07-15 PROCEDURE — 96374 THER/PROPH/DIAG INJ IV PUSH: CPT

## 2021-07-15 PROCEDURE — 74011250637 HC RX REV CODE- 250/637: Performed by: STUDENT IN AN ORGANIZED HEALTH CARE EDUCATION/TRAINING PROGRAM

## 2021-07-15 PROCEDURE — 74011250636 HC RX REV CODE- 250/636: Performed by: STUDENT IN AN ORGANIZED HEALTH CARE EDUCATION/TRAINING PROGRAM

## 2021-07-15 RX ORDER — FAMOTIDINE 20 MG/1
20 TABLET, FILM COATED ORAL DAILY
Status: DISCONTINUED | OUTPATIENT
Start: 2021-07-15 | End: 2021-07-16 | Stop reason: HOSPADM

## 2021-07-15 RX ORDER — OXYCODONE AND ACETAMINOPHEN 5; 325 MG/1; MG/1
2 TABLET ORAL
Status: DISCONTINUED | OUTPATIENT
Start: 2021-07-15 | End: 2021-07-16 | Stop reason: HOSPADM

## 2021-07-15 RX ADMIN — KETOROLAC TROMETHAMINE 30 MG: 30 INJECTION, SOLUTION INTRAMUSCULAR; INTRAVENOUS at 05:14

## 2021-07-15 RX ADMIN — HYDROMORPHONE HYDROCHLORIDE 1 MG: 1 INJECTION, SOLUTION INTRAMUSCULAR; INTRAVENOUS; SUBCUTANEOUS at 01:35

## 2021-07-15 RX ADMIN — IBUPROFEN 800 MG: 200 TABLET, FILM COATED ORAL at 11:07

## 2021-07-15 RX ADMIN — Medication 10 ML: at 21:25

## 2021-07-15 RX ADMIN — DOCUSATE SODIUM 100 MG: 100 CAPSULE ORAL at 08:23

## 2021-07-15 RX ADMIN — FAMOTIDINE 20 MG: 20 TABLET ORAL at 11:07

## 2021-07-15 RX ADMIN — Medication 10 ML: at 17:07

## 2021-07-15 RX ADMIN — SODIUM CHLORIDE, POTASSIUM CHLORIDE, SODIUM LACTATE AND CALCIUM CHLORIDE 125 ML/HR: 600; 310; 30; 20 INJECTION, SOLUTION INTRAVENOUS at 05:12

## 2021-07-15 RX ADMIN — DOCUSATE SODIUM 100 MG: 100 CAPSULE ORAL at 17:07

## 2021-07-15 RX ADMIN — HYDROMORPHONE HYDROCHLORIDE 1 MG: 1 INJECTION, SOLUTION INTRAMUSCULAR; INTRAVENOUS; SUBCUTANEOUS at 18:34

## 2021-07-15 RX ADMIN — OXYCODONE HYDROCHLORIDE AND ACETAMINOPHEN 2 TABLET: 5; 325 TABLET ORAL at 11:07

## 2021-07-15 RX ADMIN — HYDROMORPHONE HYDROCHLORIDE 1 MG: 1 INJECTION, SOLUTION INTRAMUSCULAR; INTRAVENOUS; SUBCUTANEOUS at 08:23

## 2021-07-15 RX ADMIN — OXYCODONE HYDROCHLORIDE AND ACETAMINOPHEN 1 TABLET: 5; 325 TABLET ORAL at 05:14

## 2021-07-15 RX ADMIN — OXYCODONE HYDROCHLORIDE AND ACETAMINOPHEN 2 TABLET: 5; 325 TABLET ORAL at 17:06

## 2021-07-15 RX ADMIN — Medication 10 ML: at 05:13

## 2021-07-15 RX ADMIN — IBUPROFEN 800 MG: 200 TABLET, FILM COATED ORAL at 21:25

## 2021-07-15 NOTE — PROGRESS NOTES
7/15/2021 10:18 AM   Reason for Admission: Elective admit under Dr. Wicho Groves:  A 43-year-old  2, para 1, aborta 1 who has been evaluated in the office for abnormal uterine bleeding, fibroids and pelvic pain.     PROCEDURE PERFORMED:  Abdominal myomectomy. Assessment:   [x]In person with pt   Charted address and phone numbers confirmed. RUR: n/a   Risk Level: [x]Low []Moderate []High  Value-based purchasing: [] Yes [x] No  Bundle patient: [] Yes [x] No   Specify:     Advance Directive: Full Code. [x] No AD on file. Pt declined. [] AD on file. [] Current AD not on file. Copy requested. [] Requests AD, and referral submitted to [facility]. Healthcare Decision Maker:         Assessment:    Age: 39    Sex: [] Male [x]Female     Residency: [x]Private residence     Lives With: [x]With family    Prior functioning:  [x]Independent with ADLs and iADLS []Dependent with ADLs and iADLs []Partial dependence, Specify:     Prior DME required:  [x]None []RW []Cane []Crutches []Bedside commode []CPAP []Home O2 (Liter/Provider: ) []Nebulizer   []Shower Chair []Wheelchair []Hospital Bed [][place] []Stair lift []Rollator []Other:    DME available: [x]None []RW []Cane []Crutches []Bedside commode []CPAP []Home O2 (Liter/Provider: ) []Nebulizer   []Shower Chair []Wheelchair []Hospital Bed [][place] []Stair lift []Rollator []Other:    Rehab history: [x]None []Outpatient PT []Home Health (Provider/Date: ) []SNF (Provider/Date: ) []IPR (Provider/Date: ) []LTC (Provider/Date: ) []Hospice (Provider/Date: )  []Other:     Discharge Concerns: []Yes [x]No []Unknown   Describe:    Comments:      Insurer:   Svetlana Elizabethshauna [address] [facility] [place]:     Subscriber: Sharlene Cobos Subscriber#: [id number]    Group#: [id number] Precert#:           PCP: Jean Jsesica   Address: [address] [facility] [address]   Phone number: [phone]   Current patient: [x]Yes []No   Approximate date of last visit: March 2021   Access to virtual PCP visits: [x]Yes []No    Pharmacy:  [address] Transport: Pt's mother       Transition of care plan:      [x] Home with outpatient follow-up  CM will follow. TOSIN Rico     Care Management Interventions  PCP Verified by CM:  Yes (Mary Shafer)  MyChart Signup: No  Discharge Durable Medical Equipment: No  Physical Therapy Consult: Yes  Occupational Therapy Consult: Yes  Speech Therapy Consult: No  Discharge Location  Discharge Placement: Home with family assistance

## 2021-07-15 NOTE — PROGRESS NOTES
GYN POD 1    Cheryl Poole    Resting in bed, cline removed at 0500, has not been up to void yet. Received Dilaudid IV overnight for pain not relieved by Toradol. Tolerating po. Passing flatus. Has not been out of bed yet. Vitals:  Visit Vitals  /74 (BP 1 Location: Right upper arm, BP Patient Position: At rest)   Pulse 67   Temp 97.7 °F (36.5 °C)   Resp 16   Ht 5' 8\" (1.727 m)   Wt 106.2 kg (234 lb 2.1 oz)   LMP  (Within Months) Comment: Abnormal bleeding   SpO2 97%   BMI 35.60 kg/m²     Temp (24hrs), Av.1 °F (36.7 °C), Min:97.7 °F (36.5 °C), Max:98.4 °F (36.9 °C)      Last 24hr Input/Output:    Intake/Output Summary (Last 24 hours) at 7/15/2021 0843  Last data filed at 7/15/2021 0525  Gross per 24 hour   Intake 2210 ml   Output 3225 ml   Net -1015 ml          Exam:  Patient without distress. Abdomen soft, bowel sounds present, expected tenderness. Incision dry and clean without erythema. Lower extremities are negative for swelling, cords, or tenderness. Labs:   Lab Results   Component Value Date/Time    WBC 8.5 2021 07:37 AM    HGB 10.2 (L) 2021 07:37 AM    HCT 32.6 (L) 2021 07:37 AM    PLATELET 456  07:37 AM       Assessment: POD 1 s/p abdominal myomectomy    Plan:   1. Routine care  2. ADAT, OOB, ambulate  3. Transition to oral pain medications   4.  Dispo: pending pain control, needs to ambulate, void, etc, likely POD 2     Rachid Power DO

## 2021-07-15 NOTE — PROGRESS NOTES
Bedside and Verbal shift change report given to bella(oncoming nurse) by Lakeisha Eldridge (offgoing nurse). Report included the following information SBAR, Kardex, Intake/Output, MAR and Recent Results.

## 2021-07-16 VITALS
OXYGEN SATURATION: 100 % | HEIGHT: 68 IN | TEMPERATURE: 98.3 F | HEART RATE: 63 BPM | SYSTOLIC BLOOD PRESSURE: 135 MMHG | BODY MASS INDEX: 35.48 KG/M2 | WEIGHT: 234.13 LBS | DIASTOLIC BLOOD PRESSURE: 87 MMHG | RESPIRATION RATE: 16 BRPM

## 2021-07-16 PROCEDURE — 74011250637 HC RX REV CODE- 250/637: Performed by: STUDENT IN AN ORGANIZED HEALTH CARE EDUCATION/TRAINING PROGRAM

## 2021-07-16 PROCEDURE — 99218 HC RM OBSERVATION: CPT

## 2021-07-16 RX ORDER — OXYCODONE AND ACETAMINOPHEN 5; 325 MG/1; MG/1
1-2 TABLET ORAL
Qty: 24 TABLET | Refills: 0 | Status: SHIPPED | OUTPATIENT
Start: 2021-07-16 | End: 2021-07-19

## 2021-07-16 RX ORDER — IBUPROFEN 800 MG/1
800 TABLET ORAL EVERY 8 HOURS
Qty: 21 TABLET | Refills: 1 | Status: SHIPPED | OUTPATIENT
Start: 2021-07-16

## 2021-07-16 RX ADMIN — OXYCODONE HYDROCHLORIDE AND ACETAMINOPHEN 2 TABLET: 5; 325 TABLET ORAL at 07:28

## 2021-07-16 RX ADMIN — OXYCODONE HYDROCHLORIDE AND ACETAMINOPHEN 2 TABLET: 5; 325 TABLET ORAL at 02:12

## 2021-07-16 RX ADMIN — DOCUSATE SODIUM 100 MG: 100 CAPSULE ORAL at 08:51

## 2021-07-16 RX ADMIN — Medication 10 ML: at 06:53

## 2021-07-16 RX ADMIN — IBUPROFEN 800 MG: 200 TABLET, FILM COATED ORAL at 06:53

## 2021-07-16 RX ADMIN — FAMOTIDINE 20 MG: 20 TABLET ORAL at 08:47

## 2021-07-16 NOTE — PROGRESS NOTES
7/16/2021 10:05 AM Discharge home today with family support. Family will transport pt home. TOSIN Perera    Care Management Interventions  PCP Verified by CM:  Yes (Lilly Carlson)  Jo Signup: No  Discharge Durable Medical Equipment: No  Physical Therapy Consult: Yes  Occupational Therapy Consult: Yes  Speech Therapy Consult: No  Discharge Location  Discharge Placement: Home with family assistance

## 2021-07-16 NOTE — PROGRESS NOTES
Gynecology Progress Note    Patient doing well post-op day 2 from Procedure(s):  ABDOMINAL MYOMECTOMY without significant complaints. Pain only fairly controlled on current medication. Voiding without difficulty. Patient is passing flatus. Pt ambulating wel    Vitals:  Blood pressure 135/87, pulse 63, temperature 98.3 °F (36.8 °C), resp. rate 16, height 5' 8\" (1.727 m), weight 106.2 kg (234 lb 2.1 oz), SpO2 100 %. Temp (24hrs), Av.1 °F (36.7 °C), Min:97.3 °F (36.3 °C), Max:98.6 °F (37 °C)        Exam:  Patient without distress. Abdomen soft,  nontender. Incision dry and clean without erythema. Lower extremities are negative for swelling, cords, or tenderness. Lab/Data Review: All lab results for the last 24 hours reviewed. Assessment and Plan:  Patient appears to be having uncomplicated post Procedure(s):  ABDOMINAL MYOMECTOMY course. Continue routine post-op care.  Plan to discharge home this afternoon, instructions given

## 2021-07-16 NOTE — PROGRESS NOTES
Problem: Falls - Risk of  Goal: *Absence of Falls  Description: Document Jennifer Leonardo Fall Risk and appropriate interventions in the flowsheet.   Outcome: Progressing Towards Goal  Note: Fall Risk Interventions:            Medication Interventions: Patient to call before getting OOB    Elimination Interventions: Call light in reach

## 2021-07-18 ENCOUNTER — APPOINTMENT (OUTPATIENT)
Dept: CT IMAGING | Age: 36
DRG: 354 | End: 2021-07-18
Attending: NURSE PRACTITIONER
Payer: COMMERCIAL

## 2021-07-18 ENCOUNTER — ANESTHESIA (OUTPATIENT)
Dept: SURGERY | Age: 36
DRG: 354 | End: 2021-07-18
Payer: COMMERCIAL

## 2021-07-18 ENCOUNTER — HOSPITAL ENCOUNTER (INPATIENT)
Age: 36
LOS: 4 days | Discharge: HOME | DRG: 354 | End: 2021-07-22
Attending: EMERGENCY MEDICINE | Admitting: SURGERY
Payer: COMMERCIAL

## 2021-07-18 ENCOUNTER — ANESTHESIA EVENT (OUTPATIENT)
Dept: SURGERY | Age: 36
DRG: 354 | End: 2021-07-18
Payer: COMMERCIAL

## 2021-07-18 DIAGNOSIS — A41.9 SEPSIS, DUE TO UNSPECIFIED ORGANISM, UNSPECIFIED WHETHER ACUTE ORGAN DYSFUNCTION PRESENT (HCC): ICD-10-CM

## 2021-07-18 DIAGNOSIS — K56.609 SBO (SMALL BOWEL OBSTRUCTION) (HCC): Primary | ICD-10-CM

## 2021-07-18 LAB
ALBUMIN SERPL-MCNC: 3.9 G/DL (ref 3.5–5)
ALBUMIN/GLOB SERPL: 0.8 {RATIO} (ref 1.1–2.2)
ALP SERPL-CCNC: 66 U/L (ref 45–117)
ALT SERPL-CCNC: 19 U/L (ref 12–78)
ANION GAP SERPL CALC-SCNC: 7 MMOL/L (ref 5–15)
APPEARANCE UR: ABNORMAL
AST SERPL-CCNC: 16 U/L (ref 15–37)
BACTERIA URNS QL MICRO: NEGATIVE /HPF
BASOPHILS # BLD: 0 K/UL (ref 0–0.1)
BASOPHILS NFR BLD: 0 % (ref 0–1)
BILIRUB SERPL-MCNC: 1.2 MG/DL (ref 0.2–1)
BILIRUB UR QL CFM: NEGATIVE
BUN SERPL-MCNC: 16 MG/DL (ref 6–20)
BUN/CREAT SERPL: 18 (ref 12–20)
CALCIUM SERPL-MCNC: 10.3 MG/DL (ref 8.5–10.1)
CHLORIDE SERPL-SCNC: 93 MMOL/L (ref 97–108)
CO2 SERPL-SCNC: 34 MMOL/L (ref 21–32)
COLOR UR: ABNORMAL
COMMENT, HOLDF: NORMAL
CREAT SERPL-MCNC: 0.87 MG/DL (ref 0.55–1.02)
DIFFERENTIAL METHOD BLD: ABNORMAL
EOSINOPHIL # BLD: 0 K/UL (ref 0–0.4)
EOSINOPHIL NFR BLD: 0 % (ref 0–7)
EPITH CASTS URNS QL MICRO: ABNORMAL /LPF
ERYTHROCYTE [DISTWIDTH] IN BLOOD BY AUTOMATED COUNT: 13.1 % (ref 11.5–14.5)
GLOBULIN SER CALC-MCNC: 5 G/DL (ref 2–4)
GLUCOSE SERPL-MCNC: 119 MG/DL (ref 65–100)
GLUCOSE UR STRIP.AUTO-MCNC: NEGATIVE MG/DL
GRAN CASTS URNS QL MICRO: ABNORMAL /LPF
HCG UR QL: NEGATIVE
HCT VFR BLD AUTO: 38.5 % (ref 35–47)
HGB BLD-MCNC: 12.1 G/DL (ref 11.5–16)
HGB UR QL STRIP: ABNORMAL
IMM GRANULOCYTES # BLD AUTO: 0 K/UL
IMM GRANULOCYTES NFR BLD AUTO: 0 %
KETONES UR QL STRIP.AUTO: ABNORMAL MG/DL
LACTATE BLD-SCNC: 2.12 MMOL/L (ref 0.4–2)
LEUKOCYTE ESTERASE UR QL STRIP.AUTO: ABNORMAL
LIPASE SERPL-CCNC: 103 U/L (ref 73–393)
LYMPHOCYTES # BLD: 1.6 K/UL (ref 0.8–3.5)
LYMPHOCYTES NFR BLD: 15 % (ref 12–49)
MCH RBC QN AUTO: 27.6 PG (ref 26–34)
MCHC RBC AUTO-ENTMCNC: 31.4 G/DL (ref 30–36.5)
MCV RBC AUTO: 87.7 FL (ref 80–99)
MONOCYTES # BLD: 0.7 K/UL (ref 0–1)
MONOCYTES NFR BLD: 7 % (ref 5–13)
MUCOUS THREADS URNS QL MICRO: ABNORMAL /LPF
NEUTS BAND NFR BLD MANUAL: 14 % (ref 0–6)
NEUTS SEG # BLD: 8.2 K/UL (ref 1.8–8)
NEUTS SEG NFR BLD: 64 % (ref 32–75)
NITRITE UR QL STRIP.AUTO: NEGATIVE
NRBC # BLD: 0 K/UL (ref 0–0.01)
NRBC BLD-RTO: 0 PER 100 WBC
PH UR STRIP: 5.5 [PH] (ref 5–8)
PLATELET # BLD AUTO: 400 K/UL (ref 150–400)
PMV BLD AUTO: 9.5 FL (ref 8.9–12.9)
POTASSIUM SERPL-SCNC: 3.6 MMOL/L (ref 3.5–5.1)
PROT SERPL-MCNC: 8.9 G/DL (ref 6.4–8.2)
PROT UR STRIP-MCNC: 100 MG/DL
RBC # BLD AUTO: 4.39 M/UL (ref 3.8–5.2)
RBC #/AREA URNS HPF: ABNORMAL /HPF (ref 0–5)
RBC MORPH BLD: ABNORMAL
SAMPLES BEING HELD,HOLD: NORMAL
SODIUM SERPL-SCNC: 134 MMOL/L (ref 136–145)
SP GR UR REFRACTOMETRY: >1.03 (ref 1–1.03)
UR CULT HOLD, URHOLD: NORMAL
UROBILINOGEN UR QL STRIP.AUTO: 0.2 EU/DL (ref 0.2–1)
WBC # BLD AUTO: 10.5 K/UL (ref 3.6–11)
WBC MORPH BLD: ABNORMAL
WBC URNS QL MICRO: ABNORMAL /HPF (ref 0–4)

## 2021-07-18 PROCEDURE — 65270000029 HC RM PRIVATE

## 2021-07-18 PROCEDURE — 87040 BLOOD CULTURE FOR BACTERIA: CPT

## 2021-07-18 PROCEDURE — 77030022704 HC SUT VLOC COVD -B: Performed by: SURGERY

## 2021-07-18 PROCEDURE — 76010000153 HC OR TIME 1.5 TO 2 HR: Performed by: SURGERY

## 2021-07-18 PROCEDURE — 96361 HYDRATE IV INFUSION ADD-ON: CPT

## 2021-07-18 PROCEDURE — 77030040504 HC DRN WND MDII -B: Performed by: SURGERY

## 2021-07-18 PROCEDURE — 83605 ASSAY OF LACTIC ACID: CPT

## 2021-07-18 PROCEDURE — 74011000250 HC RX REV CODE- 250: Performed by: SURGERY

## 2021-07-18 PROCEDURE — 96374 THER/PROPH/DIAG INJ IV PUSH: CPT

## 2021-07-18 PROCEDURE — 77030011278 HC ELECTRD LIG IMPT COVD -F: Performed by: SURGERY

## 2021-07-18 PROCEDURE — 74011250636 HC RX REV CODE- 250/636: Performed by: SURGERY

## 2021-07-18 PROCEDURE — 2709999900 HC NON-CHARGEABLE SUPPLY: Performed by: SURGERY

## 2021-07-18 PROCEDURE — 87205 SMEAR GRAM STAIN: CPT

## 2021-07-18 PROCEDURE — 80053 COMPREHEN METABOLIC PANEL: CPT

## 2021-07-18 PROCEDURE — 87075 CULTR BACTERIA EXCEPT BLOOD: CPT

## 2021-07-18 PROCEDURE — 83690 ASSAY OF LIPASE: CPT

## 2021-07-18 PROCEDURE — 74011000636 HC RX REV CODE- 636: Performed by: RADIOLOGY

## 2021-07-18 PROCEDURE — 77030002916 HC SUT ETHLN J&J -A: Performed by: SURGERY

## 2021-07-18 PROCEDURE — 77030031139 HC SUT VCRL2 J&J -A: Performed by: SURGERY

## 2021-07-18 PROCEDURE — 77030002986 HC SUT PROL J&J -A: Performed by: SURGERY

## 2021-07-18 PROCEDURE — 74011250636 HC RX REV CODE- 250/636: Performed by: NURSE ANESTHETIST, CERTIFIED REGISTERED

## 2021-07-18 PROCEDURE — 77030011264 HC ELECTRD BLD EXT COVD -A: Performed by: SURGERY

## 2021-07-18 PROCEDURE — 77030002996 HC SUT SLK J&J -A: Performed by: SURGERY

## 2021-07-18 PROCEDURE — 77030008684 HC TU ET CUF COVD -B: Performed by: STUDENT IN AN ORGANIZED HEALTH CARE EDUCATION/TRAINING PROGRAM

## 2021-07-18 PROCEDURE — 77030019905 HC CATH URETH INTMIT MDII -A: Performed by: SURGERY

## 2021-07-18 PROCEDURE — 76060000034 HC ANESTHESIA 1.5 TO 2 HR: Performed by: SURGERY

## 2021-07-18 PROCEDURE — 96375 TX/PRO/DX INJ NEW DRUG ADDON: CPT

## 2021-07-18 PROCEDURE — 85025 COMPLETE CBC W/AUTO DIFF WBC: CPT

## 2021-07-18 PROCEDURE — 77030026438 HC STYL ET INTUB CARD -A: Performed by: STUDENT IN AN ORGANIZED HEALTH CARE EDUCATION/TRAINING PROGRAM

## 2021-07-18 PROCEDURE — C1781 MESH (IMPLANTABLE): HCPCS | Performed by: SURGERY

## 2021-07-18 PROCEDURE — 74011000250 HC RX REV CODE- 250: Performed by: EMERGENCY MEDICINE

## 2021-07-18 PROCEDURE — 77030027876 HC STPLR ENDOSC FLX PWR J&J -G1: Performed by: SURGERY

## 2021-07-18 PROCEDURE — 74011000250 HC RX REV CODE- 250: Performed by: NURSE ANESTHETIST, CERTIFIED REGISTERED

## 2021-07-18 PROCEDURE — 77030020829: Performed by: SURGERY

## 2021-07-18 PROCEDURE — 74011250637 HC RX REV CODE- 250/637: Performed by: STUDENT IN AN ORGANIZED HEALTH CARE EDUCATION/TRAINING PROGRAM

## 2021-07-18 PROCEDURE — 74011250636 HC RX REV CODE- 250/636: Performed by: NURSE PRACTITIONER

## 2021-07-18 PROCEDURE — 77030037032 HC INSRT SCIS CLICKLLINE DISP STOR -B: Performed by: SURGERY

## 2021-07-18 PROCEDURE — C9113 INJ PANTOPRAZOLE SODIUM, VIA: HCPCS | Performed by: EMERGENCY MEDICINE

## 2021-07-18 PROCEDURE — 77030009968 HC RELD STPLR ENDOSC J&J -D: Performed by: SURGERY

## 2021-07-18 PROCEDURE — 77030018836 HC SOL IRR NACL ICUM -A: Performed by: SURGERY

## 2021-07-18 PROCEDURE — 77030019908 HC STETH ESOPH SIMS -A: Performed by: STUDENT IN AN ORGANIZED HEALTH CARE EDUCATION/TRAINING PROGRAM

## 2021-07-18 PROCEDURE — 0WUF0JZ SUPPLEMENT ABDOMINAL WALL WITH SYNTHETIC SUBSTITUTE, OPEN APPROACH: ICD-10-PCS | Performed by: SURGERY

## 2021-07-18 PROCEDURE — 74177 CT ABD & PELVIS W/CONTRAST: CPT

## 2021-07-18 PROCEDURE — 77030035236 HC SUT PDS STRATFX BARB J&J -B: Performed by: SURGERY

## 2021-07-18 PROCEDURE — 77030040506 HC DRN WND MDII -A: Performed by: SURGERY

## 2021-07-18 PROCEDURE — 36415 COLL VENOUS BLD VENIPUNCTURE: CPT

## 2021-07-18 PROCEDURE — 77030003029 HC SUT VCRL J&J -B: Performed by: SURGERY

## 2021-07-18 PROCEDURE — 77030040923 HC STPLR ENDOSC ECHELON J&J -E: Performed by: SURGERY

## 2021-07-18 PROCEDURE — 81001 URINALYSIS AUTO W/SCOPE: CPT

## 2021-07-18 PROCEDURE — 77030002966 HC SUT PDS J&J -A: Performed by: SURGERY

## 2021-07-18 PROCEDURE — 99285 EMERGENCY DEPT VISIT HI MDM: CPT

## 2021-07-18 PROCEDURE — 77030008771 HC TU NG SALEM SUMP -A: Performed by: STUDENT IN AN ORGANIZED HEALTH CARE EDUCATION/TRAINING PROGRAM

## 2021-07-18 PROCEDURE — C9290 INJ, BUPIVACAINE LIPOSOME: HCPCS | Performed by: SURGERY

## 2021-07-18 PROCEDURE — 77030012770 HC TRCR OPT FX AMR -B: Performed by: SURGERY

## 2021-07-18 PROCEDURE — 76210000016 HC OR PH I REC 1 TO 1.5 HR: Performed by: SURGERY

## 2021-07-18 PROCEDURE — C1765 ADHESION BARRIER: HCPCS | Performed by: SURGERY

## 2021-07-18 PROCEDURE — 77030013079 HC BLNKT BAIR HGGR 3M -A: Performed by: STUDENT IN AN ORGANIZED HEALTH CARE EDUCATION/TRAINING PROGRAM

## 2021-07-18 PROCEDURE — 77030018809 HC RETRCTR ALXSO DISP AMR -B: Performed by: SURGERY

## 2021-07-18 PROCEDURE — 81025 URINE PREGNANCY TEST: CPT

## 2021-07-18 PROCEDURE — 74011250636 HC RX REV CODE- 250/636: Performed by: EMERGENCY MEDICINE

## 2021-07-18 PROCEDURE — 77030005513 HC CATH URETH FOL11 MDII -B: Performed by: SURGERY

## 2021-07-18 PROCEDURE — 74011250637 HC RX REV CODE- 250/637: Performed by: SURGERY

## 2021-07-18 DEVICE — PHASIX MESH, 6" X 8" (15.2 CM X 20.3 CM), RECTANGLE
Type: IMPLANTABLE DEVICE | Site: ABDOMEN | Status: FUNCTIONAL
Brand: PHASIX

## 2021-07-18 DEVICE — PHASIX ST MESH, 7 CM X 10 CM (3" X 4"), RECTANGLE
Type: IMPLANTABLE DEVICE | Site: ABDOMEN | Status: FUNCTIONAL
Brand: PHASIX

## 2021-07-18 RX ORDER — SODIUM CHLORIDE, SODIUM LACTATE, POTASSIUM CHLORIDE, CALCIUM CHLORIDE 600; 310; 30; 20 MG/100ML; MG/100ML; MG/100ML; MG/100ML
100 INJECTION, SOLUTION INTRAVENOUS CONTINUOUS
Status: DISCONTINUED | OUTPATIENT
Start: 2021-07-18 | End: 2021-07-22

## 2021-07-18 RX ORDER — HYDROMORPHONE HYDROCHLORIDE 1 MG/ML
.5-1 INJECTION, SOLUTION INTRAMUSCULAR; INTRAVENOUS; SUBCUTANEOUS
Status: DISCONTINUED | OUTPATIENT
Start: 2021-07-18 | End: 2021-07-19 | Stop reason: HOSPADM

## 2021-07-18 RX ORDER — ONDANSETRON 2 MG/ML
4 INJECTION INTRAMUSCULAR; INTRAVENOUS EVERY 4 HOURS
Status: DISCONTINUED | OUTPATIENT
Start: 2021-07-19 | End: 2021-07-22 | Stop reason: HOSPADM

## 2021-07-18 RX ORDER — DEXAMETHASONE SODIUM PHOSPHATE 4 MG/ML
INJECTION, SOLUTION INTRA-ARTICULAR; INTRALESIONAL; INTRAMUSCULAR; INTRAVENOUS; SOFT TISSUE AS NEEDED
Status: DISCONTINUED | OUTPATIENT
Start: 2021-07-18 | End: 2021-07-19 | Stop reason: HOSPADM

## 2021-07-18 RX ORDER — NEOSTIGMINE METHYLSULFATE 1 MG/ML
INJECTION, SOLUTION INTRAVENOUS AS NEEDED
Status: DISCONTINUED | OUTPATIENT
Start: 2021-07-18 | End: 2021-07-19 | Stop reason: HOSPADM

## 2021-07-18 RX ORDER — HYDROMORPHONE HYDROCHLORIDE 1 MG/ML
0.5 INJECTION, SOLUTION INTRAMUSCULAR; INTRAVENOUS; SUBCUTANEOUS ONCE
Status: COMPLETED | OUTPATIENT
Start: 2021-07-18 | End: 2021-07-18

## 2021-07-18 RX ORDER — MIDAZOLAM HYDROCHLORIDE 1 MG/ML
INJECTION, SOLUTION INTRAMUSCULAR; INTRAVENOUS AS NEEDED
Status: DISCONTINUED | OUTPATIENT
Start: 2021-07-18 | End: 2021-07-19 | Stop reason: HOSPADM

## 2021-07-18 RX ORDER — ESMOLOL HYDROCHLORIDE 10 MG/ML
INJECTION INTRAVENOUS AS NEEDED
Status: DISCONTINUED | OUTPATIENT
Start: 2021-07-18 | End: 2021-07-19 | Stop reason: HOSPADM

## 2021-07-18 RX ORDER — ONDANSETRON 2 MG/ML
INJECTION INTRAMUSCULAR; INTRAVENOUS AS NEEDED
Status: DISCONTINUED | OUTPATIENT
Start: 2021-07-18 | End: 2021-07-19 | Stop reason: HOSPADM

## 2021-07-18 RX ORDER — LEVOFLOXACIN 5 MG/ML
750 INJECTION, SOLUTION INTRAVENOUS EVERY 24 HOURS
Status: DISCONTINUED | OUTPATIENT
Start: 2021-07-18 | End: 2021-07-21

## 2021-07-18 RX ORDER — ONDANSETRON 2 MG/ML
4 INJECTION INTRAMUSCULAR; INTRAVENOUS AS NEEDED
Status: DISCONTINUED | OUTPATIENT
Start: 2021-07-18 | End: 2021-07-19 | Stop reason: HOSPADM

## 2021-07-18 RX ORDER — PROPOFOL 10 MG/ML
INJECTION, EMULSION INTRAVENOUS AS NEEDED
Status: DISCONTINUED | OUTPATIENT
Start: 2021-07-18 | End: 2021-07-19 | Stop reason: HOSPADM

## 2021-07-18 RX ORDER — SODIUM CHLORIDE, SODIUM LACTATE, POTASSIUM CHLORIDE, CALCIUM CHLORIDE 600; 310; 30; 20 MG/100ML; MG/100ML; MG/100ML; MG/100ML
125 INJECTION, SOLUTION INTRAVENOUS CONTINUOUS
Status: DISCONTINUED | OUTPATIENT
Start: 2021-07-18 | End: 2021-07-18 | Stop reason: HOSPADM

## 2021-07-18 RX ORDER — FENTANYL CITRATE 50 UG/ML
INJECTION, SOLUTION INTRAMUSCULAR; INTRAVENOUS AS NEEDED
Status: DISCONTINUED | OUTPATIENT
Start: 2021-07-18 | End: 2021-07-19 | Stop reason: HOSPADM

## 2021-07-18 RX ORDER — SUCCINYLCHOLINE CHLORIDE 20 MG/ML
INJECTION INTRAMUSCULAR; INTRAVENOUS AS NEEDED
Status: DISCONTINUED | OUTPATIENT
Start: 2021-07-18 | End: 2021-07-19 | Stop reason: HOSPADM

## 2021-07-18 RX ORDER — LIDOCAINE HYDROCHLORIDE 10 MG/ML
0.1 INJECTION, SOLUTION EPIDURAL; INFILTRATION; INTRACAUDAL; PERINEURAL AS NEEDED
Status: DISCONTINUED | OUTPATIENT
Start: 2021-07-18 | End: 2021-07-18 | Stop reason: HOSPADM

## 2021-07-18 RX ORDER — PROCHLORPERAZINE EDISYLATE 5 MG/ML
10 INJECTION INTRAMUSCULAR; INTRAVENOUS
Status: COMPLETED | OUTPATIENT
Start: 2021-07-18 | End: 2021-07-18

## 2021-07-18 RX ORDER — KETOROLAC TROMETHAMINE 30 MG/ML
30 INJECTION, SOLUTION INTRAMUSCULAR; INTRAVENOUS EVERY 6 HOURS
Status: DISCONTINUED | OUTPATIENT
Start: 2021-07-19 | End: 2021-07-22

## 2021-07-18 RX ORDER — PHENYLEPHRINE HCL IN 0.9% NACL 0.4MG/10ML
SYRINGE (ML) INTRAVENOUS AS NEEDED
Status: DISCONTINUED | OUTPATIENT
Start: 2021-07-18 | End: 2021-07-19 | Stop reason: HOSPADM

## 2021-07-18 RX ORDER — ROCURONIUM BROMIDE 10 MG/ML
INJECTION, SOLUTION INTRAVENOUS AS NEEDED
Status: DISCONTINUED | OUTPATIENT
Start: 2021-07-18 | End: 2021-07-19 | Stop reason: HOSPADM

## 2021-07-18 RX ORDER — ACETAMINOPHEN 500 MG
1000 TABLET ORAL EVERY 8 HOURS
Status: DISCONTINUED | OUTPATIENT
Start: 2021-07-19 | End: 2021-07-22 | Stop reason: HOSPADM

## 2021-07-18 RX ORDER — LIDOCAINE HYDROCHLORIDE 20 MG/ML
INJECTION, SOLUTION EPIDURAL; INFILTRATION; INTRACAUDAL; PERINEURAL AS NEEDED
Status: DISCONTINUED | OUTPATIENT
Start: 2021-07-18 | End: 2021-07-19 | Stop reason: HOSPADM

## 2021-07-18 RX ORDER — HYDROMORPHONE HYDROCHLORIDE 1 MG/ML
1 INJECTION, SOLUTION INTRAMUSCULAR; INTRAVENOUS; SUBCUTANEOUS
Status: DISCONTINUED | OUTPATIENT
Start: 2021-07-18 | End: 2021-07-22

## 2021-07-18 RX ORDER — SCOLOPAMINE TRANSDERMAL SYSTEM 1 MG/1
1 PATCH, EXTENDED RELEASE TRANSDERMAL
Status: COMPLETED | OUTPATIENT
Start: 2021-07-18 | End: 2021-07-21

## 2021-07-18 RX ORDER — GLYCOPYRROLATE 0.2 MG/ML
INJECTION INTRAMUSCULAR; INTRAVENOUS AS NEEDED
Status: DISCONTINUED | OUTPATIENT
Start: 2021-07-18 | End: 2021-07-19 | Stop reason: HOSPADM

## 2021-07-18 RX ORDER — SODIUM CHLORIDE, SODIUM LACTATE, POTASSIUM CHLORIDE, CALCIUM CHLORIDE 600; 310; 30; 20 MG/100ML; MG/100ML; MG/100ML; MG/100ML
125 INJECTION, SOLUTION INTRAVENOUS CONTINUOUS
Status: DISCONTINUED | OUTPATIENT
Start: 2021-07-18 | End: 2021-07-19 | Stop reason: HOSPADM

## 2021-07-18 RX ORDER — ONDANSETRON 2 MG/ML
4 INJECTION INTRAMUSCULAR; INTRAVENOUS
Status: COMPLETED | OUTPATIENT
Start: 2021-07-18 | End: 2021-07-18

## 2021-07-18 RX ADMIN — Medication 80 MCG: at 21:34

## 2021-07-18 RX ADMIN — ESMOLOL HYDROCHLORIDE 10 MG: 100 INJECTION, SOLUTION INTRAVENOUS at 21:25

## 2021-07-18 RX ADMIN — DEXAMETHASONE SODIUM PHOSPHATE 8 MG: 4 INJECTION, SOLUTION INTRAMUSCULAR; INTRAVENOUS at 21:48

## 2021-07-18 RX ADMIN — ESMOLOL HYDROCHLORIDE 20 MG: 100 INJECTION, SOLUTION INTRAVENOUS at 21:55

## 2021-07-18 RX ADMIN — ONDANSETRON 4 MG: 2 INJECTION INTRAMUSCULAR; INTRAVENOUS at 11:43

## 2021-07-18 RX ADMIN — ONDANSETRON 4 MG: 2 INJECTION INTRAMUSCULAR; INTRAVENOUS at 23:58

## 2021-07-18 RX ADMIN — KETOROLAC TROMETHAMINE 30 MG: 30 INJECTION, SOLUTION INTRAMUSCULAR; INTRAVENOUS at 23:58

## 2021-07-18 RX ADMIN — LIDOCAINE HYDROCHLORIDE 60 MG: 20 INJECTION, SOLUTION EPIDURAL; INFILTRATION; INTRACAUDAL; PERINEURAL at 21:20

## 2021-07-18 RX ADMIN — SODIUM CHLORIDE 1000 ML: 9 INJECTION, SOLUTION INTRAVENOUS at 11:44

## 2021-07-18 RX ADMIN — MIDAZOLAM 2 MG: 1 INJECTION, SOLUTION INTRAMUSCULAR; INTRAVENOUS at 21:10

## 2021-07-18 RX ADMIN — ESMOLOL HYDROCHLORIDE 20 MG: 100 INJECTION, SOLUTION INTRAVENOUS at 21:40

## 2021-07-18 RX ADMIN — LEVOFLOXACIN 750 MG: 5 INJECTION, SOLUTION INTRAVENOUS at 13:59

## 2021-07-18 RX ADMIN — ROCURONIUM BROMIDE 10 MG: 10 INJECTION INTRAVENOUS at 21:20

## 2021-07-18 RX ADMIN — FENTANYL CITRATE 100 MCG: 50 INJECTION, SOLUTION INTRAMUSCULAR; INTRAVENOUS at 21:17

## 2021-07-18 RX ADMIN — ONDANSETRON HYDROCHLORIDE 4 MG: 2 SOLUTION INTRAMUSCULAR; INTRAVENOUS at 22:01

## 2021-07-18 RX ADMIN — ACETAMINOPHEN 1000 MG: 500 TABLET, FILM COATED ORAL at 23:58

## 2021-07-18 RX ADMIN — SUGAMMADEX 200 MG: 100 INJECTION, SOLUTION INTRAVENOUS at 22:54

## 2021-07-18 RX ADMIN — Medication 5 MG: at 22:31

## 2021-07-18 RX ADMIN — IOPAMIDOL 100 ML: 755 INJECTION, SOLUTION INTRAVENOUS at 13:37

## 2021-07-18 RX ADMIN — ESMOLOL HYDROCHLORIDE 10 MG: 100 INJECTION, SOLUTION INTRAVENOUS at 21:33

## 2021-07-18 RX ADMIN — PROCHLORPERAZINE EDISYLATE 10 MG: 5 INJECTION INTRAMUSCULAR; INTRAVENOUS at 13:55

## 2021-07-18 RX ADMIN — HYDROMORPHONE HYDROCHLORIDE 0.5 MG: 1 INJECTION, SOLUTION INTRAMUSCULAR; INTRAVENOUS; SUBCUTANEOUS at 19:34

## 2021-07-18 RX ADMIN — PROPOFOL 150 MG: 10 INJECTION, EMULSION INTRAVENOUS at 21:20

## 2021-07-18 RX ADMIN — PANTOPRAZOLE SODIUM 40 MG: 40 INJECTION, POWDER, FOR SOLUTION INTRAVENOUS at 13:53

## 2021-07-18 RX ADMIN — Medication 80 MCG: at 21:35

## 2021-07-18 RX ADMIN — SUCCINYLCHOLINE CHLORIDE 100 MG: 20 INJECTION, SOLUTION INTRAMUSCULAR; INTRAVENOUS at 21:20

## 2021-07-18 RX ADMIN — GLYCOPYRROLATE 0.6 MG: 0.2 INJECTION INTRAMUSCULAR; INTRAVENOUS at 22:31

## 2021-07-18 RX ADMIN — CEFEPIME HYDROCHLORIDE 2 G: 2 INJECTION, POWDER, FOR SOLUTION INTRAVENOUS at 13:46

## 2021-07-18 RX ADMIN — SODIUM CHLORIDE, POTASSIUM CHLORIDE, SODIUM LACTATE AND CALCIUM CHLORIDE 75 ML/HR: 600; 310; 30; 20 INJECTION, SOLUTION INTRAVENOUS at 21:00

## 2021-07-18 RX ADMIN — ROCURONIUM BROMIDE 40 MG: 10 INJECTION INTRAVENOUS at 21:29

## 2021-07-18 NOTE — ED TRIAGE NOTES
Patient presents to ED for nausea and vomiting post-op. States that she had a myomectomy on Wednesday and was discharged two days later, sent home with prescription for promethazine. States that since getting home, she has been vomiting and unable to eat or take pain meds. Has had 1 BM since surgery.

## 2021-07-18 NOTE — ED PROVIDER NOTES
Date of Service:  7/18/2021    Patient:  Zee aWll    Chief Complaint:  Post OP Complication       HPI:  Zee Wall is a 39 y.o.  female who presents for evaluation of nausea and vomiting. Patient had a myomectomy 5 days ago. She stayed in the hospital until 2 days ago. She went home and ever since her discharge, she has been able to keep anything down. She has had some vague abdominal discomfort as well as nausea and vomiting, not able to keep down her pain medicines or her antinausea medicines. She arrives here awake alert and oriented with main complaint of vomiting. She has postoperative pain which she has been unable to control but she is been vomiting. Otherwise no real appetite, not really eating or drinking anything due to the vomiting. She states that she had a temperature \"100 point something\" yesterday but is unsure what the number was. Otherwise she states that she feels very dehydrated. Her pain is a 7 out of 10. No other acute complaints           Past Medical History:   Diagnosis Date    Gestational HTN     History of anemia     History of seasonal allergies     HIV (human immunodeficiency virus infection) ([facility] [address]        Past Surgical History:   Procedure Laterality Date    HX WISDOM TEETH EXTRACTION N/A          No family history on file. Social History     Socioeconomic History    Marital status: SINGLE     Spouse name: Not on file    Number of children: Not on file    Years of education: Not on file    Highest education level: Not on file   Occupational History    Not on file   Tobacco Use    Smoking status: Never Smoker    Smokeless tobacco: Never Used   Substance and Sexual Activity    Alcohol use:  Yes     Alcohol/week: 1.0 - 2.0 standard drinks     Types: 1 - 2 Glasses of wine per week    Drug use: Never    Sexual activity: Not on file   Other Topics Concern    Not on file   Social History Narrative    Not on file     Social Determinants of Health Financial Resource Strain:     Difficulty of Paying Living Expenses:    Food Insecurity:     Worried About Running Out of Food in the Last Year:     [address] in the Last Year:    Transportation Needs:     Lack of Transportation (Medical):  Lack of Transportation (Non-Medical):    Physical Activity:     Days of Exercise per Week:     Minutes of Exercise per Session:    Stress:     Feeling of Stress :    Social Connections:     Frequency of Communication with Friends and Family:     Frequency of Social Gatherings with Friends and Family:     Attends Jain Services:     Active Member of Clubs or Organizations:     Attends Club or Organization Meetings:     Marital Status:    Intimate Partner Violence:     Fear of Current or Ex-Partner:     Emotionally Abused:     Physically Abused:     Sexually Abused: ALLERGIES: Hydrocodone    Review of Systems   Constitutional: Positive for fever. HENT: Negative for hearing loss. Eyes: Negative for visual disturbance. Respiratory: Negative for shortness of breath. Cardiovascular: Negative for chest pain. Gastrointestinal: Positive for abdominal pain, nausea and vomiting. Genitourinary: Negative for flank pain. Musculoskeletal: Negative for back pain. Skin: Negative for rash. Neurological: Negative for dizziness and light-headedness. Psychiatric/Behavioral: Negative for confusion. Vitals:    07/18/21 1121 07/18/21 1330 07/18/21 1400 07/18/21 1404   BP: 138/85 115/79 134/76    Pulse: (!) 117 (!) 112 (!) 120    Resp: 18 26 24    Temp: 99.1 °F (37.3 °C)      SpO2: 98% 96%  96%   Weight: 107.8 kg (237 lb 10.5 oz)      Height: 5' 8\" (1.727 m)               Physical Exam  Vitals and nursing note reviewed. Constitutional:       General: She is in acute distress. Appearance: Normal appearance. She is not ill-appearing. HENT:      Head: Normocephalic and atraumatic.       Mouth/Throat:      Mouth: Mucous membranes are dry. Eyes:      General: No scleral icterus. Cardiovascular:      Rate and Rhythm: Tachycardia present. Pulses: Normal pulses. Heart sounds: No murmur heard. Pulmonary:      Effort: Pulmonary effort is normal. No respiratory distress. Abdominal:      General: Abdomen is flat. Tenderness: There is abdominal tenderness. Musculoskeletal:         General: Normal range of motion. Skin:     General: Skin is warm. Capillary Refill: Capillary refill takes less than 2 seconds. Neurological:      Mental Status: She is alert and oriented to person, place, and time. Psychiatric:         Mood and Affect: Mood normal.          MDM  Number of Diagnoses or Management Options     Amount and/or Complexity of Data Reviewed  Decide to obtain previous medical records or to obtain history from someone other than the patient: yes      ED Course as of Jul 18 1808   Sun Jul 18, 2021   1243 Ketone(!): TRACE [GG]   1243 BAND NEUTROPHILS(!): 14 [GG]      ED Course User Index  [GG] Kristen Torres,      VITAL SIGNS:  No data found. LABS:  No results found for this or any previous visit (from the past 6 hour(s)). IMAGING:  CT ABD PELV W CONT   Final Result      1. There is subcutaneous edema and air most likely postoperative. 2. The stomach is fluid-filled and distended. There are multiple distended loops   of small bowel which are fluid-filled and air-filled in the abdomen and pelvis   consistent with small bowel obstruction. There is a tubular structure in the   midline in the pelvis between the rectus muscles suspicious for a nondilated   loop of small bowel in a hernia which may be responsible for the small bowel   obstruction and clinical correlation would be helpful. There is a small amount of ascites. There is slight bibasilar atelectasis.             Medications During Visit:  Medications   cefepime (MAXIPIME) 2 g in sterile water (preservative free) 10 mL IV syringe (2 g IntraVENous New Bag 7/18/21 1346)   levoFLOXacin (LEVAQUIN) 750 mg in D5W IVPB (750 mg IntraVENous New Bag 7/18/21 1359)   sodium chloride 0.9 % bolus infusion 1,000 mL (0 mL IntraVENous IV Completed 7/18/21 1333)   ondansetron (ZOFRAN) injection 4 mg (4 mg IntraVENous Given 7/18/21 1143)   pantoprazole (PROTONIX) 40 mg in 0.9% sodium chloride 10 mL injection (40 mg IntraVENous Given 7/18/21 1353)   prochlorperazine (COMPAZINE) injection 10 mg (10 mg IntraVENous Given 7/18/21 1355)   iopamidoL (ISOVUE-370) 76 % injection 100 mL (100 mL IntraVENous Given 7/18/21 1337)         DECISION MAKING:  Perry Ortiz is a 39 y.o. female who comes in as above. Diagnostics as above. Patient meets sepsis criteria. Repeat lactic ordered, fluid provided, antibiotics ordered. CT imaging shows bowel obstruction. Dr. Denis Nash has been contacted. Patient will be admitted to the hospital for further evaluation. NG placed    Total critical care time spent exclusive of procedures:  58 minutes      IMPRESSION:  1. SBO (small bowel obstruction) ([facility] [address]    2.  Sepsis, due to unspecified organism, unspecified whether acute organ dysfunction present [facility])        DISPOSITION:  Admitted      Procedures

## 2021-07-18 NOTE — ED NOTES
11:26 AM  I have evaluated the patient as the Provider in Triage. I have reviewed Her vital signs and the triage nurse assessment. I have talked with the patient and any available family and advised that I am the provider in triage and have ordered the appropriate study to initiate their work up based on the clinical presentation during my assessment. I have advised that the patient will be accommodated in the Main ED as soon as possible. I have also requested to contact the triage nurse or myself immediately if the patient experiences any changes in their condition during this brief waiting period. Status post myomectomy on Wednesday by Shannen Abdul. Stayed in the hospital until Friday. Returns today to the emergency room with worsening nausea, vomiting, inability to tolerate p.o., tachycardia and pain. Low-grade fevers noted around 100 degrees at home.   Raymundo Flores NP

## 2021-07-19 LAB
ANION GAP SERPL CALC-SCNC: 5 MMOL/L (ref 5–15)
BASOPHILS # BLD: 0 K/UL (ref 0–0.1)
BASOPHILS NFR BLD: 0 % (ref 0–1)
BUN SERPL-MCNC: 15 MG/DL (ref 6–20)
BUN/CREAT SERPL: 23 (ref 12–20)
CALCIUM SERPL-MCNC: 8.7 MG/DL (ref 8.5–10.1)
CHLORIDE SERPL-SCNC: 100 MMOL/L (ref 97–108)
CO2 SERPL-SCNC: 31 MMOL/L (ref 21–32)
CREAT SERPL-MCNC: 0.66 MG/DL (ref 0.55–1.02)
DIFFERENTIAL METHOD BLD: ABNORMAL
EOSINOPHIL # BLD: 0 K/UL (ref 0–0.4)
EOSINOPHIL NFR BLD: 0 % (ref 0–7)
ERYTHROCYTE [DISTWIDTH] IN BLOOD BY AUTOMATED COUNT: 13.3 % (ref 11.5–14.5)
GLUCOSE SERPL-MCNC: 116 MG/DL (ref 65–100)
HCT VFR BLD AUTO: 33.8 % (ref 35–47)
HGB BLD-MCNC: 10.7 G/DL (ref 11.5–16)
IMM GRANULOCYTES # BLD AUTO: 0 K/UL
IMM GRANULOCYTES NFR BLD AUTO: 0 %
LACTATE SERPL-SCNC: 1 MMOL/L (ref 0.4–2)
LACTATE SERPL-SCNC: 2.1 MMOL/L (ref 0.4–2)
LYMPHOCYTES # BLD: 0.8 K/UL (ref 0.8–3.5)
LYMPHOCYTES NFR BLD: 12 % (ref 12–49)
MCH RBC QN AUTO: 27.6 PG (ref 26–34)
MCHC RBC AUTO-ENTMCNC: 31.7 G/DL (ref 30–36.5)
MCV RBC AUTO: 87.3 FL (ref 80–99)
MONOCYTES # BLD: 0.1 K/UL (ref 0–1)
MONOCYTES NFR BLD: 2 % (ref 5–13)
MYELOCYTES NFR BLD MANUAL: 1 %
NEUTS BAND NFR BLD MANUAL: 22 % (ref 0–6)
NEUTS SEG # BLD: 6 K/UL (ref 1.8–8)
NEUTS SEG NFR BLD: 63 % (ref 32–75)
NRBC # BLD: 0 K/UL (ref 0–0.01)
NRBC BLD-RTO: 0 PER 100 WBC
PLATELET # BLD AUTO: 327 K/UL (ref 150–400)
PMV BLD AUTO: 9.3 FL (ref 8.9–12.9)
POTASSIUM SERPL-SCNC: 4.1 MMOL/L (ref 3.5–5.1)
RBC # BLD AUTO: 3.87 M/UL (ref 3.8–5.2)
RBC MORPH BLD: ABNORMAL
RBC MORPH BLD: ABNORMAL
SODIUM SERPL-SCNC: 136 MMOL/L (ref 136–145)
WBC # BLD AUTO: 7 K/UL (ref 3.6–11)

## 2021-07-19 PROCEDURE — 65270000029 HC RM PRIVATE

## 2021-07-19 PROCEDURE — 97161 PT EVAL LOW COMPLEX 20 MIN: CPT

## 2021-07-19 PROCEDURE — 74011000250 HC RX REV CODE- 250: Performed by: EMERGENCY MEDICINE

## 2021-07-19 PROCEDURE — 83605 ASSAY OF LACTIC ACID: CPT

## 2021-07-19 PROCEDURE — 97116 GAIT TRAINING THERAPY: CPT

## 2021-07-19 PROCEDURE — 74011250637 HC RX REV CODE- 250/637: Performed by: SURGERY

## 2021-07-19 PROCEDURE — 80048 BASIC METABOLIC PNL TOTAL CA: CPT

## 2021-07-19 PROCEDURE — 36415 COLL VENOUS BLD VENIPUNCTURE: CPT

## 2021-07-19 PROCEDURE — 74011250636 HC RX REV CODE- 250/636: Performed by: EMERGENCY MEDICINE

## 2021-07-19 PROCEDURE — 74011250636 HC RX REV CODE- 250/636: Performed by: SURGERY

## 2021-07-19 PROCEDURE — 85025 COMPLETE CBC W/AUTO DIFF WBC: CPT

## 2021-07-19 RX ADMIN — SODIUM CHLORIDE, POTASSIUM CHLORIDE, SODIUM LACTATE AND CALCIUM CHLORIDE 100 ML/HR: 600; 310; 30; 20 INJECTION, SOLUTION INTRAVENOUS at 16:44

## 2021-07-19 RX ADMIN — LEVOFLOXACIN 750 MG: 5 INJECTION, SOLUTION INTRAVENOUS at 13:24

## 2021-07-19 RX ADMIN — ONDANSETRON 4 MG: 2 INJECTION INTRAMUSCULAR; INTRAVENOUS at 10:01

## 2021-07-19 RX ADMIN — HYDROMORPHONE HYDROCHLORIDE 1 MG: 1 INJECTION, SOLUTION INTRAMUSCULAR; INTRAVENOUS; SUBCUTANEOUS at 10:32

## 2021-07-19 RX ADMIN — CEFEPIME HYDROCHLORIDE 2 G: 2 INJECTION, POWDER, FOR SOLUTION INTRAVENOUS at 00:30

## 2021-07-19 RX ADMIN — ONDANSETRON 4 MG: 2 INJECTION INTRAMUSCULAR; INTRAVENOUS at 13:22

## 2021-07-19 RX ADMIN — CEFEPIME HYDROCHLORIDE 2 G: 2 INJECTION, POWDER, FOR SOLUTION INTRAVENOUS at 17:54

## 2021-07-19 RX ADMIN — HYDROMORPHONE HYDROCHLORIDE 1 MG: 1 INJECTION, SOLUTION INTRAMUSCULAR; INTRAVENOUS; SUBCUTANEOUS at 21:44

## 2021-07-19 RX ADMIN — ONDANSETRON 4 MG: 2 INJECTION INTRAMUSCULAR; INTRAVENOUS at 20:52

## 2021-07-19 RX ADMIN — KETOROLAC TROMETHAMINE 30 MG: 30 INJECTION, SOLUTION INTRAMUSCULAR; INTRAVENOUS at 17:54

## 2021-07-19 RX ADMIN — KETOROLAC TROMETHAMINE 30 MG: 30 INJECTION, SOLUTION INTRAMUSCULAR; INTRAVENOUS at 06:04

## 2021-07-19 RX ADMIN — KETOROLAC TROMETHAMINE 30 MG: 30 INJECTION, SOLUTION INTRAMUSCULAR; INTRAVENOUS at 13:22

## 2021-07-19 RX ADMIN — ACETAMINOPHEN 1000 MG: 500 TABLET, FILM COATED ORAL at 06:05

## 2021-07-19 RX ADMIN — ACETAMINOPHEN 1000 MG: 500 TABLET, FILM COATED ORAL at 13:21

## 2021-07-19 RX ADMIN — ONDANSETRON 4 MG: 2 INJECTION INTRAMUSCULAR; INTRAVENOUS at 04:18

## 2021-07-19 RX ADMIN — ONDANSETRON 4 MG: 2 INJECTION INTRAMUSCULAR; INTRAVENOUS at 17:54

## 2021-07-19 RX ADMIN — CEFEPIME HYDROCHLORIDE 2 G: 2 INJECTION, POWDER, FOR SOLUTION INTRAVENOUS at 10:01

## 2021-07-19 NOTE — PROGRESS NOTES
Orders received and PT has tried twice to initiate therapy with patient. She has refused both times despite PT encouragement,  but wants PT to return later. Notified nursing. Will return later as time permits.

## 2021-07-19 NOTE — PROGRESS NOTES
General Surgery Daily Progress Note    Patient: Perry Ortiz MRN: [id number]  SSN: [id number]    YOB: 1985  Age: 39 y.o. Sex: female      Admit Date: 7/18/2021    POD 1 Day Post-Op    Procedure: Procedure(s): Wound Exploration, Reduction incarcerated small bowel, Incisional Hernia repair with mesh    Subjective:   Patient feeling better this Am. IV pain meds and tylenol controlling pain. Current Facility-Administered Medications   Medication Dose Route Frequency    cefepime (MAXIPIME) 2 g in sterile water (preservative free) 10 mL IV syringe  2 g IntraVENous Q8H    levoFLOXacin (LEVAQUIN) 750 mg in D5W IVPB  750 mg IntraVENous Q24H    lactated Ringers infusion  100 mL/hr IntraVENous CONTINUOUS    scopolamine (TRANSDERM-SCOP) 1 mg over 3 days 1 Patch  1 Patch TransDERmal Q72H    ondansetron (ZOFRAN) injection 4 mg  4 mg IntraVENous Q4H    HYDROmorphone (DILAUDID) syringe 1 mg  1 mg IntraVENous Q4H PRN    ketorolac (TORADOL) injection 30 mg  30 mg IntraVENous Q6H    acetaminophen (TYLENOL) tablet 1,000 mg  1,000 mg Oral Q8H        Objective:   07/19 0701 - 07/19 1900  In: -   Out: 300 [Urine:300]  07/17 1901 - 07/19 0700  In: 2660 [P.O.:50; I.V.:2610]  Out: 1150 [Urine:400; Drains:50]  Patient Vitals for the past 8 hrs:   BP Temp Pulse Resp SpO2   07/19/21 0916 116/82 99.2 °F (37.3 °C) (!) 108 18 96 %   07/19/21 0315 121/80 99.2 °F (37.3 °C) (!) 108 20 97 %       Physical Exam:  General: Alert, cooperative, NAD  Lungs: Unlabored  Heart:  Tachycardia  Abdomen: Soft, appropriate tenderness, mildly distended. Incisions C/D/I, Abdominal binder in place and NANCY drain with serosanginous fluid.    Extremities: Warm, moves all, no edema  Skin:  Warm and dry, no rash    Labs:   Recent Labs     07/19/21  0400   WBC 7.0   HGB 10.7*   HCT 33.8*        Recent Labs     07/19/21  0108 07/18/21  1139 07/18/21  1139      < > 134*   K 4.1   < > 3.6      < > 93*   CO2 31   < > 34*   GLU 116*   < > 119*   BUN 15   < > 16   CREA 0.66   < > 0.87   CA 8.7   < > 10.3*   ALB  --   --  3.9   TBILI  --   --  1.2*   ALT  --   --  19    < > = values in this interval not displayed. Assessment / Plan:     POD 1 Day Post-Op    Procedure: Procedure(s):   Wound Exploration, Reduction incarcerated small bowel, Incisional Hernia repair with mesh     Patient doing well this AM.  Pain controlled with tylenol and IV pain meds  Diet: Will start clears today  OOB/ambulate  Continue to wear abdominal binder  Continue NANCY drain

## 2021-07-19 NOTE — PROGRESS NOTES
Spiritual Care Assessment/Progress Note  [address] [place] [address] Dr      NAME: Aliyah Saleh      MRN: [id number]  AGE: 39 y.o. SEX: female  Jainism Affiliation: Gnosticist   Language: English     7/19/2021     Total Time (in minutes): 18     Spiritual Assessment begun in [facility] POST SURG ORT 1 through conversation with:         []Patient        [] Family    [] Friend(s)        Reason for Consult: Advance medical directive consult     Spiritual beliefs: (Please include comment if needed)     [] Identifies with a sriram tradition:         [] Supported by a sriram community:            [] Claims no spiritual orientation:           [] Seeking spiritual identity:                [] Adheres to an individual form of spirituality:           [x] Not able to assess:                           Identified resources for coping:      [] Prayer                               [] Music                  [] Guided Imagery     [] Family/friends                 [] Pet visits     [] Devotional reading                         [x] Unknown     [] Other:                                              Interventions offered during this visit: (See comments for more details)    Patient Interventions: Other (comment) (Unable to assess)           Plan of Care:     [] Support spiritual and/or cultural needs    [x] Support AMD and/or advance care planning process      [] Support grieving process   [] Coordinate Rites and/or Rituals    [] Coordination with community clergy   [] No spiritual needs identified at this time   [] Detailed Plan of Care below (See Comments)  [] Make referral to Music Therapy  [] Make referral to Pet Therapy     [] Make referral to Addiction services  [] Make referral to [facility]  [] Make referral to Spiritual Care Partner  [] No future visits requested        [] Follow up upon further referrals     Comments:  responded to a consult request from Case Management to assist Mrs. [place] with Advance Care Planning documents on the Post Surgical Ortho unit. Consulted with her  who shared that Mrs. Ulises Correa was not interested in completing the documents today as she wasn't feeling well, but would like to have more information. Mrs. Ulises Correa appeared to be sleeping when the  came into her room. A friend was also sleeping in the cot next to her.  left the AMD forms and the Your Right To Decide brochure on Mrs. Poole's bedside table. 's are available for further support upon referral  Yadiel Paredes. Abilio Hicks.      Paging Service: [phone])

## 2021-07-19 NOTE — ACP (ADVANCE CARE PLANNING)
Advance Care Planning   Advance Care Planning Inpatient Note  [address] [facility]    Today's Date: 7/19/2021  Unit: [facility] POST SURG ORT 1    Received request from consult from case management. Upon review of chart and communication with care team, Spiritual Care will defer Advance Care planning with patient at this time. Patient and Friends was/were present in the room during visit. Goals of ACP Conversation:   deferred Advance Care Planning discussion at this time    [address]:      Click here to complete [address] including selection of the Healthcare Decision Maker Relationship (ie \"Primary\")     Today we:  Deferred Advance Care Planning discussion at this time    [address] (Patient Wishes) on file:  None     Assessment:     responded to a consult request from Case Management to assist Mrs. Ulises Correa with Advance Care Planning documents on the Post Surgical Ortho unit. Consulted with her  who shared that Mrs. Ulises Correa was not interested in completing the documents today as she wasn't feeling well, but would like to have more information. Mrs. Ulises Correa appeared to be sleeping when the  came into her room. A friend was also sleeping in the cot next to her.  left the AMD forms and the Your Right To Decide brochure on Mrs. Poole's bedside table. 's are available for further support upon referral       Interventions:  Deferred conversation as patient not interested in completing an advance directive at this time     Outcomes/Plan:  Left AMD forms on Mrs. Poole's bedside table     Electronically signed by Johnathon Sotelo on 7/19/2021 at 11:40 AM

## 2021-07-19 NOTE — PROGRESS NOTES
Problem: Mobility Impaired (Adult and Pediatric)  Goal: *Acute Goals and Plan of Care (Insert Text)  Description: FUNCTIONAL STATUS PRIOR TO ADMISSION: Patient was independent and active without use of DME.    HOME SUPPORT PRIOR TO ADMISSION: The patient lived with  and daughter but did not require assist.    Physical Therapy Goals  Initiated 7/19/2021  1. Patient will move from supine to sit and sit to supine  in bed with modified independence within 7 day(s). 2.  Patient will transfer from bed to chair and chair to bed with independence using the least restrictive device within 7 day(s). 3.  Patient will perform sit to stand with independence within 7 day(s). 4.  Patient will ambulate with independence for 200 feet with the least restrictive device within 7 day(s). 5.  Patient will ascend/descend 4 stairs with 1 handrail(s) with modified independence within 7 day(s). Outcome: Progressing Towards Goal   PHYSICAL THERAPY EVALUATION  Patient: Caterina Canales (5 y.o. female)  Date: 7/19/2021  Primary Diagnosis: Bowel obstruction (HCC) [K56.609]  Procedure(s) (LRB): Wound Exploration, Reduction incarcerated small bowel, Incisional Hernia repair with mesh (N/A) 1 Day Post-Op   Precautions:    (abdominal surgery; binder and drain in place)    ASSESSMENT  Based on the objective data described below, the patient presents with generalized weakness, decreased bed mobility, transfers and functional ambulation from baseline of complete independence following admission for bowel obstruction. Patient has history of myomectomy 6 days ago. She went home and then returned to ED on 7/18 with nausea and vomiting. She is POD #1 for  reduction of small bowel and hernia repair. Patient is appropriate  to return home and no services should be needed. She will benefit from ongoing PT while in hospital and is agreeable.      Current Level of Function Impacting Discharge (mobility/balance): Received in bed ready for PT this time after refusing twice earlier today. Patient sat on edge of bed with modified independence with reliance on bed rail and HOB elevated. She stood and ambulated 100 feet with CGA. Slight path deviations but no loss of balance. Left up in chair and nursing aware. Functional Outcome Measure: The patient scored 65/100 on the Barthel outcome measure. Other factors to consider for discharge: none     Patient will benefit from skilled therapy intervention to address the above noted impairments. PLAN :  Recommendations and Planned Interventions: bed mobility training, transfer training, and gait training      Frequency/Duration: Patient will be followed by physical therapy:  daily to address goals. Recommendation for discharge: (in order for the patient to meet his/her long term goals)  No skilled physical therapy/ follow up rehabilitation needs identified at this time. This discharge recommendation:  Has not yet been discussed the attending provider and/or case management    IF patient discharges home will need the following DME: none         SUBJECTIVE:   Patient stated It feels good to get out of the bed.     OBJECTIVE DATA SUMMARY:   HISTORY:    Past Medical History:   Diagnosis Date    Gestational HTN     History of anemia     History of seasonal allergies      Past Surgical History:   Procedure Laterality Date    HX GYN      myomectomy    HX WISDOM TEETH EXTRACTION N/A        Personal factors and/or comorbidities impacting plan of care: none    Home Situation  Home Environment: Private residence  # Steps to Enter: 6  Rails to Enter: Yes  One/Two Story Residence: One story  Living Alone: No  Support Systems: Family member(s)  Patient Expects to be Discharged to[de-identified] House  Current DME Used/Available at Home: Glucometer, Blood pressure cuff    EXAMINATION/PRESENTATION/DECISION MAKING:   Critical Behavior:  Neurologic State: Alert  Orientation Level: Oriented X4  Cognition: Follows commands  Safety/Judgement: Awareness of environment  Hearing: Auditory  Auditory Impairment: None  Skin:  not fully observed    Range Of Motion:  AROM: Within functional limits                       Strength:    Strength: Generally decreased, functional                    Tone & Sensation:   Tone: Normal              Sensation: Intact                         Functional Mobility:  Bed Mobility:     Supine to Sit: Additional time;Modified independent     Scooting: Stand-by assistance  Transfers:  Sit to Stand: Contact guard assistance  Stand to Sit: Contact guard assistance                       Balance:   Sitting: Intact  Standing: Intact  Ambulation/Gait Training:  Distance (ft): 100 Feet (ft)  Assistive Device: Gait belt  Ambulation - Level of Assistance: Contact guard assistance        Gait Abnormalities: Path deviations              Speed/Yari: Pace decreased (<100 feet/min)                                  Functional Measure:  Barthel Index:    Bathin  Bladder: 10  Bowels: 10  Groomin  Dressin  Feeding: 10  Mobility: 10  Stairs: 0  Toilet Use: 5  Transfer (Bed to Chair and Back): 10  Total: 65/100       The Barthel ADL Index: Guidelines  1. The index should be used as a record of what a patient does, not as a record of what a patient could do. 2. The main aim is to establish degree of independence from any help, physical or verbal, however minor and for whatever reason. 3. The need for supervision renders the patient not independent. 4. A patient's performance should be established using the best available evidence. Asking the patient, friends/relatives and nurses are the usual sources, but direct observation and common sense are also important. However direct testing is not needed. 5. Usually the patient's performance over the preceding 24-48 hours is important, but occasionally longer periods will be relevant.   6. Middle categories imply that the patient supplies over 50 per cent of the effort. 7. Use of aids to be independent is allowed. Jai Jasso., Barthel, D.W. (8574). Functional evaluation: the Barthel Index. [address] (14)2. TOBIN House, Christiano Vasquez., Suzanne Rodas., [place], [address] (). Measuring the change indisability after inpatient rehabilitation; comparison of the responsiveness of the Barthel Index and Functional [place] Measure. Journal of Neurology, Neurosurgery, and Psychiatry, 66(4), 948-553. MONICA Vera, LEYDI Avery, & [employer]. (2004.) Assessment of post-stroke quality of life in cost-effectiveness studies: The usefulness of the Barthel Index and the EuroQoL-5D. Quality of Life Research, 15, 753-10           Physical Therapy Evaluation Charge Determination   History Examination Presentation Decision-Making   LOW Complexity : Zero comorbidities / personal factors that will impact the outcome / POC LOW Complexity : 1-2 Standardized tests and measures addressing body structure, function, activity limitation and / or participation in recreation  LOW Complexity : Stable, uncomplicated  Other outcome measures Barthel  LOW       Based on the above components, the patient evaluation is determined to be of the following complexity level: LOW     Pain Ratin/10 to abdomen and back    Activity Tolerance:   Good    After treatment patient left in no apparent distress:   Sitting in chair and Call bell within reach    COMMUNICATION/EDUCATION:   The patients plan of care was discussed with: Registered nurse. Fall prevention education was provided and the patient/caregiver indicated understanding. and Patient/family agree to work toward stated goals and plan of care.     Thank you for this referral.  Humaira Stoddard, PT   Time Calculation: 23 mins

## 2021-07-19 NOTE — H&P
Timothy Gabriel [facility] [place] [address]  HISTORY AND PHYSICAL    Name:  Lawrence Krishnan  MR#:  [id number]  :  1985  ACCOUNT #:  [de-identified]  ADMIT DATE:  2021      PRIMARY CARE PROVIDER:  Ellen Thomas MD    SURGEON OF RECORD:  Alejandra Marino DO    CHIEF COMPLAINT:  This is H and P for high-grade obstruction. HISTORY OF PRESENT ILLNESS:  The patient is a 77-year-old female status post myomectomy 2021. The patient noticed a pop two days ago when she was trying to stand up. Yesterday, she began to have onset of projectile vomiting which has worsened throughout the day. She came in today for evaluation at [address] [place] [address]. In the Emergency Department of [address] [place] [address], white blood cell count was 10.5. BUN and creatinine 15 and 0.6 respectively. Lactic acid 2.1. CT of the abdomen and pelvis with contrast reveals subcutaneous emphysema and air leak. Stomach is fluid-filled and distended. Multiple distended loops of small bowel, fluid-filled and air-filled, in the abdomen and pelvis with small bowel obstruction. There is a tubular structure in the midline of the pelvis between the rectus sheath muscles, mildly dilated with some loop of small bowel and hernia which may be responsible for the small bowel obstruction and clinical correlation will be helpful. Dr. Hdz Speaks from the Emergency Department called for a General Surgery consult for evaluation. REVIEW OF SYSTEMS:  Positive for restless legs. Negative for HEENT, respiratory, cardiovascular, genitourinary, musculoskeletal, neurologic, psychiatric, endocrine and hematology. PAST MEDICAL HISTORY:  1.  Obesity. 2.  Gestational hypertension. 3.  Dyslipidemia. 4.  History of seasonal allergies. 5.  The patient has what appeared to be an erroneous documentation of HIV. I have no further information to corroborate or remove this from the record at this time. PAST SURGICAL HISTORY:  See HPI.   Bucks teeth extraction. SOCIAL HISTORY:  She is single. Nonsmoker. She drinks alcohol occasionally. She works as a [employer] nurse. HOME MEDICATIONS:  1. Ibuprofen. 2.  Cetirizine. 3.  Montelukast.  4.  Valacyclovir. 5.  Gabapentin. 6.  Naproxen. 7.  Medroxyprogesterone. ALLERGIES:  HYDROCODONE. PHYSICAL EXAMINATION:  VITAL SIGNS:  Pulse is 117. Height 5 feet 8 inches tall, weight 107 kg. Oxygen saturation is 98% on room air, blood pressure 130/85. CONSTITUTIONAL:  She is an age-appropriate female, in no acute distress. HEENT:  Normocephalic and atraumatic. NECK:  Supple. Trachea is midline. CHEST:  Clear. Nonlabored breathing. HEART:  Rate is tachycardic. ABDOMEN:  Distended. The patient is quite tender in lower abdomen, which is not unusual given she is only postop day four from her initial surgery. The Steri-Strips are intact and appear appropriate. There is no pus. MUSCULOSKELETAL:  No clubbing, cyanosis, or edema. SKIN:  Clear. No evidence of rash or lesions. PSYCHIATRIC:  Appropriate to questions and pleasant. NEUROLOGIC:  Grossly nonfocal.    RADIOLOGY:  See HPI. I have reviewed the images myself and agree with the findings. LABORATORY DATA:  I have reviewed the labs in the HPI and agree with the findings. ASSESSMENT:  The patient has a high-grade obstruction from what appears to be a piece of small bowel that is incarcerated at the level of the low midline Pfannenstiel incision. PLAN:  The patient had an NG tube placed in the Emergency Department but could not tolerate placement  She did have an evacuation of about approximately 400 mL of enteric content. Risks and benefits of nonoperative management was discussed. My concern is for not only the high-grade obstruction but any evidence or any question of strangulation to small bowel in this rent. The patient consented for diagnostic laparoscopy, possible bowel obstruction, possible repair of incisional hernia.   All questions were answered to patient's satisfaction. Total time in consultation including review of the CT scan, discussion of the plan with patient and her mother and hospitalist is approximately 45 minutes, of which 25 minutes was face-to-face care with the patient. It is a pleasure to participate in her care.       Layne Matute MD      [id number]  D:  07/19/2021 12:15  T:  07/19/2021 17:19  JOB #:  [id number]  CC:  MD Tong Garrido Opal Hilts, MD

## 2021-07-19 NOTE — ANESTHESIA PREPROCEDURE EVALUATION
Relevant Problems   No relevant active problems       Anesthetic History   No history of anesthetic complications            Review of Systems / Medical History  Patient summary reviewed and pertinent labs reviewed    Pulmonary              Pertinent negatives: No COPD, asthma and recent URI     Neuro/Psych              Cardiovascular                  Exercise tolerance: >4 METS     GI/Hepatic/Renal     GERD: well controlled           Endo/Other        Obesity     Other Findings              Physical Exam    Airway  Mallampati: II  TM Distance: 4 - 6 cm  Neck ROM: normal range of motion   Mouth opening: Normal     Cardiovascular    Rhythm: regular  Rate: normal         Dental    Dentition: Lower dentition intact and Upper dentition intact     Pulmonary  Breath sounds clear to auscultation               Abdominal  GI exam deferred       Other Findings            Anesthetic Plan    ASA: 2  Anesthesia type: general            Anesthetic plan and risks discussed with: Patient

## 2021-07-19 NOTE — BRIEF OP NOTE
Brief Postoperative Note    Patient: Abdias Simon  YOB: 1985  MRN: [id number]    Date of Procedure: 7/18/2021     Pre-Op Diagnosis: small bowel obstruction    Post-Op Diagnosis: Posterior rectus sheath rent, incaracerated small bowel loop      Procedure(s): Wound Exploration, Reduction incarcerated small bowel, Incisional Hernia repair with mesh    Surgeon(s):  Sybil Gaucher, MD    Surgical Assistant: Surg Asst-1: Izzy Trujillo    Anesthesia: General     Estimated Blood Loss (mL): Minimal    Complications: Other: Bowel obstruction    Specimens:   ID Type Source Tests Collected by Time Destination   1 : Abdominal wall culture Wound  CULTURE, ANAEROBIC, CULTURE, WOUND W GRAM STAIN Sybil Gaucher, MD 7/18/2021 2223 Microbiology        Implants:   Implant Name Type Inv. Item Serial No.  Lot No. LRB No. Used Action   MESH SURG [id number] RECT FULL REABSORBABLE FOR SFT TISS RECON - SN/A  MESH SURG [id number] RECT FULL REABSORBABLE FOR SFT TISS RECON N/A BARD DAVOL_WD [id number] N/A 1 Implanted   MESH SURG [id number] [employer] RECT PHASIX - SN/A  MESH SURG [id number] [facility] RECT PHASIX N/A BARD DAVOL_WD [id number] N/A 1 Implanted       Drains:   Chapis Del Valle #1 07/18/21 Anterior;Left;Lower Abdomen (Active)     Findings: Viable incarcerated jejunal loop,  3 cm rent in posterior sheath.      Electronically Signed by Colby Johnston MD on 7/18/2021 at 11:40 PM

## 2021-07-19 NOTE — PROGRESS NOTES
0310: on call MD paged for a lactic acid of 2.1  0404: On call MD paged a second time. On call MD returned call for a Critical lab value, \"Lactic acid of 2.1 is irrelevant at 4 in the morning. \" No orders received, will continue to monitor.

## 2021-07-19 NOTE — PROGRESS NOTES
7/19/2021  9:53 AM  CM met w/ pt in  person,CM wore mask at all times. Charted demographics  verified, pt lies w/ parents in 1 story home, there are 6 entry steps. At baseline pt is ambulatory, iADLs, drives, family can assist at DC:  Reason for Admission: Emergent for  Small Bowel Obstruction  Pt is a 38 yo SF who [facility] to [facility] ED c/o nausea and vomiting, pt had myomectomy 5 days ago and was discharged from hospital until 2 days prior to this admisison  PMHx; Uterine Fibroid              RUR Score:   5 % Low Risk of readmission                  Plan for utilizing home health:   NO history, PT eval pending  DME: None  Rx: [employer], pt uses [employer], [address], reports no difficulty affording her medications     PCP: First and Last name:  Annmarie Jernigan MD     Name of Practice:    Are you a current patient: Yes/No: Yes   Approximate date of last visit: > 30 days  Can you participate in a virtual visit with your PCP:     YES                Current Advanced Directive/Advance Care Plan: Prior  [facility] mother Opal [facility] [phone]    Healthcare Decision Maker:   Click here to complete [address] including selection of the Healthcare Decision Maker Relationship (ie \"Primary\")                             Transition of Care Plan:                    RUR 5%  LOS 1 Day  1. Hospital admission for surgical management  2. PT eval  3. CM to follow through for treatment/respone  4. ACP planning Spiritual Care Consult, placed in CC   5. DC when stable to home w/ family assistance  6. Outpatient f/u surgery, PCP  7. Family will transport    Care Management Interventions  PCP Verified by CM:  Yes (Rosio Devlin MD; last visit > 30 days)  Palliative Care Criteria Met (RRAT>21 & CHF Dx)?: No  Mode of Transport at Discharge: Self (Family)  Physical Therapy Consult: Yes  Occupational Therapy Consult: No  Speech Therapy Consult: No  Current Support Network: Relative's Home (pt lives w/ parents in pvt residence, baseline pt is ambulatory,iADLs, drives)  Confirm Follow Up Transport: Family  Discharge Location  Discharge Placement: Home with outpatient services  EMMANUEL Johnson

## 2021-07-20 PROCEDURE — 74011250636 HC RX REV CODE- 250/636: Performed by: EMERGENCY MEDICINE

## 2021-07-20 PROCEDURE — 97116 GAIT TRAINING THERAPY: CPT

## 2021-07-20 PROCEDURE — 74011000250 HC RX REV CODE- 250: Performed by: EMERGENCY MEDICINE

## 2021-07-20 PROCEDURE — 65270000029 HC RM PRIVATE

## 2021-07-20 PROCEDURE — 74011250636 HC RX REV CODE- 250/636: Performed by: SURGERY

## 2021-07-20 PROCEDURE — 74011250637 HC RX REV CODE- 250/637: Performed by: SURGERY

## 2021-07-20 RX ADMIN — CEFEPIME HYDROCHLORIDE 2 G: 2 INJECTION, POWDER, FOR SOLUTION INTRAVENOUS at 09:20

## 2021-07-20 RX ADMIN — CEFEPIME HYDROCHLORIDE 2 G: 2 INJECTION, POWDER, FOR SOLUTION INTRAVENOUS at 00:29

## 2021-07-20 RX ADMIN — SODIUM CHLORIDE, POTASSIUM CHLORIDE, SODIUM LACTATE AND CALCIUM CHLORIDE 100 ML/HR: 600; 310; 30; 20 INJECTION, SOLUTION INTRAVENOUS at 14:54

## 2021-07-20 RX ADMIN — LEVOFLOXACIN 750 MG: 5 INJECTION, SOLUTION INTRAVENOUS at 14:52

## 2021-07-20 RX ADMIN — ONDANSETRON 4 MG: 2 INJECTION INTRAMUSCULAR; INTRAVENOUS at 04:59

## 2021-07-20 RX ADMIN — ALUMINUM HYDROXIDE AND MAGNESIUM HYDROXIDE 30 ML: 200; 200 SUSPENSION ORAL at 19:05

## 2021-07-20 RX ADMIN — KETOROLAC TROMETHAMINE 30 MG: 30 INJECTION, SOLUTION INTRAMUSCULAR; INTRAVENOUS at 00:31

## 2021-07-20 RX ADMIN — ONDANSETRON 4 MG: 2 INJECTION INTRAMUSCULAR; INTRAVENOUS at 08:19

## 2021-07-20 RX ADMIN — KETOROLAC TROMETHAMINE 30 MG: 30 INJECTION, SOLUTION INTRAMUSCULAR; INTRAVENOUS at 18:09

## 2021-07-20 RX ADMIN — ONDANSETRON 4 MG: 2 INJECTION INTRAMUSCULAR; INTRAVENOUS at 00:32

## 2021-07-20 RX ADMIN — ONDANSETRON 4 MG: 2 INJECTION INTRAMUSCULAR; INTRAVENOUS at 12:17

## 2021-07-20 RX ADMIN — ONDANSETRON 4 MG: 2 INJECTION INTRAMUSCULAR; INTRAVENOUS at 21:46

## 2021-07-20 RX ADMIN — KETOROLAC TROMETHAMINE 30 MG: 30 INJECTION, SOLUTION INTRAMUSCULAR; INTRAVENOUS at 12:17

## 2021-07-20 RX ADMIN — CEFEPIME HYDROCHLORIDE 2 G: 2 INJECTION, POWDER, FOR SOLUTION INTRAVENOUS at 17:04

## 2021-07-20 RX ADMIN — KETOROLAC TROMETHAMINE 30 MG: 30 INJECTION, SOLUTION INTRAMUSCULAR; INTRAVENOUS at 08:19

## 2021-07-20 RX ADMIN — HYDROMORPHONE HYDROCHLORIDE 1 MG: 1 INJECTION, SOLUTION INTRAMUSCULAR; INTRAVENOUS; SUBCUTANEOUS at 23:10

## 2021-07-20 RX ADMIN — ONDANSETRON 4 MG: 2 INJECTION INTRAMUSCULAR; INTRAVENOUS at 17:04

## 2021-07-20 NOTE — PROGRESS NOTES
Problem: Mobility Impaired (Adult and Pediatric)  Goal: *Acute Goals and Plan of Care (Insert Text)  Description: FUNCTIONAL STATUS PRIOR TO ADMISSION: Patient was independent and active without use of DME.    HOME SUPPORT PRIOR TO ADMISSION: The patient lived with  and daughter but did not require assist.    Physical Therapy Goals  Initiated 7/19/2021  1. Patient will move from supine to sit and sit to supine  in bed with modified independence within 7 day(s). 2.  Patient will transfer from bed to chair and chair to bed with independence using the least restrictive device within 7 day(s). 3.  Patient will perform sit to stand with independence within 7 day(s). 4.  Patient will ambulate with independence for 200 feet with the least restrictive device within 7 day(s). 5.  Patient will ascend/descend 4 stairs with 1 handrail(s) with modified independence within 7 day(s). Outcome: Progressing Towards Goal  Note:   PHYSICAL THERAPY TREATMENT  Patient: Diana Andrew (33 y.o. female)  Date: 7/20/2021  Diagnosis: [facility]el [facility] ([facility] [E64.543] <principal problem not specified>  Procedure(s) (LRB): Wound Exploration, Reduction incarcerated small bowel, Incisional Hernia repair with mesh (N/A) 2 Days Post-Op  Precautions:  (abdominal surgery; binder and drain in place)  Chart, physical therapy assessment, plan of care and goals were reviewed. ASSESSMENT  Patient continues with skilled PT services and is progressing towards goals. Pt SBA for functional mobility and gait training without AD. Noted increased trunk sway, no LOB. Denies increased pain or SOB with activity. Pt is safe to walk hallways with mobility team, nursing or family assistance. .     Current Level of Function Impacting Discharge (mobility/balance): SBA    Other factors to consider for discharge:          PLAN :  Patient continues to benefit from skilled intervention to address the above impairments.   Continue treatment per established plan of care. to address goals. Recommendation for discharge: (in order for the patient to meet his/her long term goals)  No skilled physical therapy/ follow up rehabilitation needs identified at this time. This discharge recommendation:  Has been made in collaboration with the attending provider and/or case management    IF patient discharges home will need the following DME: none       SUBJECTIVE:   Patient stated i am doing ok.     OBJECTIVE DATA SUMMARY:   Critical Behavior:  Neurologic State: Alert  Orientation Level: Oriented X4  Cognition: Follows commands  Safety/Judgement: Awareness of environment  Functional Mobility Training:  Bed Mobility:     Supine to Sit: Modified independent     Scooting: Modified independent        Transfers:  Sit to Stand: Stand-by assistance  Stand to Sit: Stand-by assistance                             Balance:  Sitting: Intact  Standing: Intact; Without support  Ambulation/Gait Training:  Distance (ft): 300 Feet (ft)  Assistive Device: Gait belt  Ambulation - Level of Assistance: Stand-by assistance        Gait Abnormalities: Decreased step clearance;Trunk sway increased              Speed/Yari: Pace decreased (<100 feet/min)                       Stairs: Therapeutic Exercises:     Pain Rating:  Denies pain    Activity Tolerance:   Good    After treatment patient left in no apparent distress:   Supine in bed and Call bell within reach    COMMUNICATION/COLLABORATION:   The patients plan of care was discussed with: Registered nurseAlfie Floyd   Time Calculation: 15 mins

## 2021-07-20 NOTE — PROGRESS NOTES
General Surgery Daily Progress Note    Patient: Shine Avila MRN: [id number]  SSN: [id number]    YOB: 1985  Age: 39 y.o. Sex: female      Admit Date: 7/18/2021    POD 2 Days Post-Op    Procedure: Procedure(s): Wound Exploration, Reduction incarcerated small bowel, Incisional Hernia repair with mesh    Subjective: Mónica clears  Denies N/V  Had BM    Current Facility-Administered Medications   Medication Dose Route Frequency    cefepime (MAXIPIME) 2 g in sterile water (preservative free) 10 mL IV syringe  2 g IntraVENous Q8H    levoFLOXacin (LEVAQUIN) 750 mg in D5W IVPB  750 mg IntraVENous Q24H    lactated Ringers infusion  100 mL/hr IntraVENous CONTINUOUS    scopolamine (TRANSDERM-SCOP) 1 mg over 3 days 1 Patch  1 Patch TransDERmal Q72H    ondansetron (ZOFRAN) injection 4 mg  4 mg IntraVENous Q4H    HYDROmorphone (DILAUDID) syringe 1 mg  1 mg IntraVENous Q4H PRN    ketorolac (TORADOL) injection 30 mg  30 mg IntraVENous Q6H    acetaminophen (TYLENOL) tablet 1,000 mg  1,000 mg Oral Q8H        Objective:   No intake/output data recorded. 07/18 1901 - 07/20 0700  In: 2660 [P.O.:50; I.V.:2610]  Out: 1633 [Urine:900; Drains:170]  Patient Vitals for the past 8 hrs:   BP Temp Pulse Resp SpO2   07/20/21 0828 113/75 98.1 °F (36.7 °C) (!) 109 18 94 %   07/20/21 0403 122/83 97.4 °F (36.3 °C) (!) 110 17 95 %       Physical Exam:  General: Alert, cooperative, NAD  Lungs: Unlabored  Abdomen: Soft, appropriately tender, non-distended. Incisions C/D/I. NANCY serosanguinous. Dressing changed.  .   Extremities: Warm, moves all, no edema  Skin:  Warm and dry, no rash    Labs:   Recent Labs     07/19/21  0400   WBC 7.0   HGB 10.7*   HCT 33.8*        Recent Labs     07/19/21  0108 07/18/21  1139 07/18/21  1139      < > 134*   K 4.1   < > 3.6      < > 93*   CO2 31   < > 34*   *   < > 119*   BUN 15   < > 16   CREA 0.66   < > 0.87   CA 8.7   < > 10.3*   ALB  --   --  3.9   TBILI  --   -- 1.2*   ALT  --   --  19    < > = values in this interval not displayed. Assessment / Plan:     POD 2 Days Post-Op    Procedure: Procedure(s):   Wound Exploration, Reduction incarcerated small bowel, Incisional Hernia repair with mesh    Advance to regular diet  If tolerates, likely ready to go home tomorrow  Dressing changed  Abdominal binder in place      Myron Betts MD

## 2021-07-20 NOTE — PROGRESS NOTES
This follow-up visit for spiritual care took place in patients room (417). Patient welcomed the visit and expressed that she is recovering from surgery. That she is hopeful that the surgery will help and that she has supportive family and Orthodoxy. She lives with her mother and her daughter with her. About the Advanced Medical Directive, she said that she would like to discuss it when her mother is here. She said that her mother is coming later today and  said he would check back with them. Patient expressed her gratitude to .      Chaplain Юлия Meredith M.Div.  Abel Starr ([id number])

## 2021-07-20 NOTE — OP NOTES
[facility] 79  OPERATIVE REPORT    Name:  Ginna De La O  MR#:  [id number]  :  1985  ACCOUNT #:  [de-identified]  DATE OF SERVICE:  2021    PRIMARY CARE PROVIDER:  Maddi Johnson MD    PREOPERATIVE DIAGNOSIS:  Small-bowel obstruction. POSTOPERATIVE DIAGNOSIS:  Posterior rectus rent with an incarcerated small-bowel loop. PROCEDURES PERFORMED:  1. Wound exposure. 2.  Reduction of incarcerated small-bowel. 3.  Incisional hernia repair with mesh. SURGEON:  Dash Bonner MD    ASSISTANT:  Izzy Trujillo    ANESTHESIA:  General     COMPLICATIONS:  Bowel obstruction. SPECIMENS REMOVED:  none    IMPLANTS:  none    ESTIMATED BLOOD LOSS:  Minimal.    FINDINGS:  Viable incarcerated jejunal loop pancaked between the 3 cm distal posterior sheath and the rectus muscle. INDICATIONS:  The patient is a pleasant 60-year-old female, status post myomectomy on 2021. The patient presents to the hospital with high-grade obstruction and what appeared to be a loop of bowel caught in the abdominal wall causing high-grade obstruction. PROCEDURE:  Consent was obtained. She was taken to the operating room and placed in a supine position. SCDs were turned on and working. She did not require a Tucker catheter. Abdomen was prepped in the usual sterile fashion. The patient is on therapeutic antibiotics. Time-out was performed per protocol. The Steri-Strips were removed from the Pfannenstiel incision. The skin was opened with a skin knife and carried down to the anterior sheath fascia. Anterior sheath fascia interrupted Vicryl sutures were opened with heavy scissors, revealing an incarcerated loop of jejunum. It appeared viable but formed a pancake between the right lower rectus muscle and the posterior sheath. The loop incarcerated a 2 cm posterior sheath defect that appeared to be torn away or evolved from its prior stitch.   Once reduced, the abdominal wall was irrigated with saline. The loop was then reduced back to the abdominal wall carefully. 2-0 PDS sutures were then used to reapproximate the posterior sheath rent. I did not feel very confident that this would hold and provide a level of security that she would not recur in the next day or so as the stitches were already starting to pull through. To that end, I did not think it was appropriate to leave permanent mesh in her abdominal wall for reinforcement. Therefore, I used a combination of synthetic mesh with a bioabsorbable component. I used a 7 x 10 cm piece of absorbable composite synthetic mesh for the posterior sheath. This was laid in the midline, just below the umbilical stalk, then was carefully sutured onto the posterior sheath  After this was secured carefully, the rectus muscle naturally laid in placed and secured the mesh. I did not put any suture to the rectus muscle as it would tear. The more corrugated surface of the composite mesh should give some integrity to the midline position of the rectus sheath muscles. At this time, to continue to plicate and give some strength to the abdominal wall, I placed an anterior rectus sheath underlay bare Phasix piece of mesh. Mesh was 6 x 8 cm. This would also give more reinforcement to the rectus sheath muscles that laid in the midline. Of note, her linea semilunaris bilaterally appeared to be thin and there was very little support in this lateral portion of the rectus sheath. Mesh sutured into place with interrupted 2-0 PDS anterior sheath stitches, stretching the mesh as tight as possible. Once the mesh was laid in place, I closed the anterior Pfannenstiel incision with a running #1 unidirectional Stratafix PDS stitch. The fascia was quite robust and came together nicely with the anterior sheath underlay Phasix mesh laying underneath.  giving buttress and structure to the midline closure.   NANCY drain was brought to drain the subcutaneous tissue defect to help reduce the risk of seroma. EXPAREL expanded with 0.5% Marcaine plain injected to the ilioinguinal plane and directed to the fascia. A 19-Mauritian channel drain was brought into drain the subcutaneous space. This drain exited the left upper quadrant skin and was sutured to the skin with 3-0 nylon sutures. Dermis was reapproximated with 3-0 Vicryl stitch. Skin was closed with staples. Dressings were placed. A binder was put onto the patient for support. She tolerated the procedure well and was returned to PACU in stable condition. I discussed the findings and condition with mother in the waiting area.       Imtiaz Nash MD      [id number]  D:  07/19/2021 12:28  T:  07/19/2021 23:41  JOB #:  [id number]  CC:  Sita Perez MD

## 2021-07-20 NOTE — ANESTHESIA POSTPROCEDURE EVALUATION
Procedure(s): Wound Exploration, Reduction incarcerated small bowel, Incisional Hernia repair with mesh.     general    Anesthesia Post Evaluation      Multimodal analgesia: multimodal analgesia used between 6 hours prior to anesthesia start to PACU discharge  Patient location during evaluation: PACU  Patient participation: complete - patient participated  Level of consciousness: awake and alert  Pain management: adequate  Airway patency: patent  Anesthetic complications: no  Cardiovascular status: acceptable  Respiratory status: acceptable  Hydration status: acceptable  Post anesthesia nausea and vomiting:  none  Final Post Anesthesia Temperature Assessment:  Normothermia (36.0-37.5 degrees C)      INITIAL Post-op Vital signs:   Vitals Value Taken Time   /70 07/19/21 0001   Temp 36.6 °C (97.8 °F) 07/18/21 2300   Pulse 100 07/19/21 0001   Resp 28 07/19/21 0001   SpO2 94 % 07/19/21 0001

## 2021-07-20 NOTE — PROGRESS NOTES
This was a follow-up visit to discuss the Advance Medical Directive (AMD) when patients mother was present. Patients mother and patients daughter were present in the room where this visit took place. The patient is not  and verified that she would like to have/keep her mother as primary medical decision maker as legal next of kin has it. Spiritual assessment was performed earlier in the day and therefore,  discussed the AMD with patient and family. Patient asked for a form and  gave her an AMD form and a \"Your Right to Decide\" booklet.  then went over the entire form with patient and her mother. Patient, Art Bonilla expressed that she understands it better and said that she would like to discuss it further with family and asked if she could keep the form.  also advised them how to fill out the form and how to get it into her medical chart.  advised them how to contact [facility] should they would like further assistance and/or any needs. Art Carmen Bonilla and her mother shaun thanked  for the visit and the education.      Chaplain Ricardo Dias M.Div.  Shyanne Sunshine ([id number])

## 2021-07-20 NOTE — ACP (ADVANCE CARE PLANNING)
Advance Care Planning   Advance Care Planning Inpatient Note  [address] [facility]    Today's Date: 7/20/2021  Unit: SFM 4M POST SURG ORT 1    Received request from Consult. Upon review of chart and communication with care team, patient's decision making abilities are not in question. Patient and Mother and patients minor daughter was/were present in the room during visit. Goals of ACP Conversation:  Facilitate a discussion related to patient's goals of care as they align with the patient's values and beliefs    Health Care Decision Makers:      Primary Decision Maker: Quin Poole - Mother - [phone]  Click here to complete [address] including selection of the Healthcare Decision Maker Relationship (ie \"Primary\")     Today we:  Documented Next of Kin, per patient report    Advance Care Planning Documents (Patient Wishes) on file:  None     Assessment: This was a follow-up visit to discuss the Advance Medical Directive (AMD) when patients mother was present. Patients mother and patients daughter were in the room where this visit took place. The patient is not  and verified that she would like to have/keep her mother as primary medical decision maker as legal next of kin has it. Spiritual assessment was performed earlier in the day and therefore,  discussed the AMD with patient and family. Patient asked for a form and  gave her an AMD form and a \"Your Right to Decide\" booklet.  then went over the entire form with patient and her mother. Patient, Art Barreto, expressed that she understands it better and said that she would like to discuss it further with family and asked if she could keep the form.  also advised them how to fill out the form and how to get it into her medical chart.  advised them how to contact [facility] should they would like further assistance and/or any needs. Art Barreto and her mother shaun thanked  for the visit and the education.        Interventions:  Provided education on documents for clarity and greater understanding  Discussed and provided education on state decision maker hierarchy  Encouraged ongoing ACP conversation with future decision makers and loved ones     Outcomes/Plan:  ACP Discussion Completed  Teach Back Method used to verify the patient/Healthcare Decision Maker's understanding of key information in the advance directive documents     Electronically signed by Chaplain Igor on 7/20/2021 at 4:10 PM

## 2021-07-20 NOTE — PROGRESS NOTES
7/20/2021  1:13 PM  Pt discussed in IDR rounds, is continuing to require medical management for small bowel obstruction, s/p Posterior rectus rent with an incarcerated small-bowel loop. Transition of Care Plan:                    RUR 5%  LOS 2 Days  1. Medical  Management continues, POD # 1 Wound Exploration, Reduction incarcerated small bowel, Incisional Hernia repair with mesh  2. PT eval completed, pt cleared, no skilled services at DC   3. CM to follow through for treatment/respone  4. DC when stable to home w/ family assistance  5. Outpatient f/u surgery, PCP  6.  Family will transport  Chyrl Rinne, BS

## 2021-07-21 LAB
BACTERIA SPEC CULT: ABNORMAL
BACTERIA SPEC CULT: NORMAL
GRAM STN SPEC: ABNORMAL
GRAM STN SPEC: ABNORMAL
SERVICE CMNT-IMP: ABNORMAL
SERVICE CMNT-IMP: NORMAL

## 2021-07-21 PROCEDURE — 65270000029 HC RM PRIVATE

## 2021-07-21 PROCEDURE — C9113 INJ PANTOPRAZOLE SODIUM, VIA: HCPCS | Performed by: SURGERY

## 2021-07-21 PROCEDURE — 74011250636 HC RX REV CODE- 250/636: Performed by: EMERGENCY MEDICINE

## 2021-07-21 PROCEDURE — 74011250636 HC RX REV CODE- 250/636: Performed by: SURGERY

## 2021-07-21 PROCEDURE — 74011000250 HC RX REV CODE- 250: Performed by: EMERGENCY MEDICINE

## 2021-07-21 PROCEDURE — 74011000250 HC RX REV CODE- 250: Performed by: SURGERY

## 2021-07-21 PROCEDURE — 74011250637 HC RX REV CODE- 250/637: Performed by: SURGERY

## 2021-07-21 RX ORDER — SUCRALFATE 1 G/1
1 TABLET ORAL
Status: DISCONTINUED | OUTPATIENT
Start: 2021-07-21 | End: 2021-07-22 | Stop reason: HOSPADM

## 2021-07-21 RX ADMIN — KETOROLAC TROMETHAMINE 30 MG: 30 INJECTION, SOLUTION INTRAMUSCULAR; INTRAVENOUS at 12:45

## 2021-07-21 RX ADMIN — CEFEPIME HYDROCHLORIDE 2 G: 2 INJECTION, POWDER, FOR SOLUTION INTRAVENOUS at 01:31

## 2021-07-21 RX ADMIN — KETOROLAC TROMETHAMINE 30 MG: 30 INJECTION, SOLUTION INTRAMUSCULAR; INTRAVENOUS at 23:35

## 2021-07-21 RX ADMIN — SUCRALFATE 1 G: 1 TABLET ORAL at 15:44

## 2021-07-21 RX ADMIN — HYDROMORPHONE HYDROCHLORIDE 1 MG: 1 INJECTION, SOLUTION INTRAMUSCULAR; INTRAVENOUS; SUBCUTANEOUS at 09:55

## 2021-07-21 RX ADMIN — ONDANSETRON 4 MG: 2 INJECTION INTRAMUSCULAR; INTRAVENOUS at 05:41

## 2021-07-21 RX ADMIN — ONDANSETRON 4 MG: 2 INJECTION INTRAMUSCULAR; INTRAVENOUS at 15:44

## 2021-07-21 RX ADMIN — SODIUM CHLORIDE 40 MG: 9 INJECTION INTRAMUSCULAR; INTRAVENOUS; SUBCUTANEOUS at 21:22

## 2021-07-21 RX ADMIN — KETOROLAC TROMETHAMINE 30 MG: 30 INJECTION, SOLUTION INTRAMUSCULAR; INTRAVENOUS at 05:42

## 2021-07-21 RX ADMIN — ONDANSETRON 4 MG: 2 INJECTION INTRAMUSCULAR; INTRAVENOUS at 21:22

## 2021-07-21 RX ADMIN — ONDANSETRON 4 MG: 2 INJECTION INTRAMUSCULAR; INTRAVENOUS at 01:31

## 2021-07-21 RX ADMIN — ONDANSETRON 4 MG: 2 INJECTION INTRAMUSCULAR; INTRAVENOUS at 09:55

## 2021-07-21 RX ADMIN — KETOROLAC TROMETHAMINE 30 MG: 30 INJECTION, SOLUTION INTRAMUSCULAR; INTRAVENOUS at 01:31

## 2021-07-21 RX ADMIN — SUCRALFATE 1 G: 1 TABLET ORAL at 12:45

## 2021-07-21 RX ADMIN — KETOROLAC TROMETHAMINE 30 MG: 30 INJECTION, SOLUTION INTRAMUSCULAR; INTRAVENOUS at 17:45

## 2021-07-21 RX ADMIN — SUCRALFATE 1 G: 1 TABLET ORAL at 21:22

## 2021-07-21 RX ADMIN — ONDANSETRON 4 MG: 2 INJECTION INTRAMUSCULAR; INTRAVENOUS at 23:35

## 2021-07-21 RX ADMIN — SODIUM CHLORIDE 40 MG: 9 INJECTION INTRAMUSCULAR; INTRAVENOUS; SUBCUTANEOUS at 09:54

## 2021-07-21 NOTE — PROGRESS NOTES
Problem: Falls - Risk of  Goal: *Absence of Falls  Description: Document Olman Aden Fall Risk and appropriate interventions in the flowsheet.   Outcome: Progressing Towards Goal  Note: Fall Risk Interventions:            Medication Interventions: Evaluate medications/consider consulting pharmacy    Elimination Interventions: Call light in reach              Problem: Patient Education: Go to Patient Education Activity  Goal: Patient/Family Education  Outcome: Progressing Towards Goal     Problem: Patient Education: Go to Patient Education Activity  Goal: Patient/Family Education  Outcome: Progressing Towards Goal

## 2021-07-21 NOTE — PROGRESS NOTES
Physical therapy    1215 Pt received in bed, declining to participate with therapy at this time, reporting increased nausea and \"did too much yesterday\" Offered OOB to chair, pt continued to decline. Pt will attempt next treatment day    Nevaeh Vasquez

## 2021-07-21 NOTE — PROGRESS NOTES
7/21/2021  9:24 AM  CM discussed pt w/ Dr Inez Gilford, pt is continuing to require medical management for small bowel obstruction, s/p Posterior rectus rent with an incarcerated small-bowel loop. Transition of Care Plan:                    RUR 5%  LOS 3 Days  1. Medical  Management continues, POD # 2 Wound Exploration, Reduction incarcerated small bowel, Incisional Hernia repair with mesh  2. CM to follow through for treatment/respone  3. DC when stable to home w/ family assistance, likely home tomorrow  4. Outpatient f/u surgery, PCP  5.  Family will transport  EMMANUEL Paz

## 2021-07-21 NOTE — PROGRESS NOTES
Has some reflux, good BM. Vitals appropriate. Abdomen soft, clips in place     Clips out tomorrow, steri strips. Re-start protonix. Possible home tomorrow.     Ysabel Walter MD

## 2021-07-22 VITALS
DIASTOLIC BLOOD PRESSURE: 63 MMHG | SYSTOLIC BLOOD PRESSURE: 109 MMHG | BODY MASS INDEX: 36.02 KG/M2 | OXYGEN SATURATION: 99 % | WEIGHT: 237.66 LBS | HEART RATE: 78 BPM | TEMPERATURE: 98.5 F | RESPIRATION RATE: 17 BRPM | HEIGHT: 68 IN

## 2021-07-22 PROCEDURE — 74011250637 HC RX REV CODE- 250/637: Performed by: SURGERY

## 2021-07-22 PROCEDURE — 74011250636 HC RX REV CODE- 250/636: Performed by: SURGERY

## 2021-07-22 RX ORDER — IBUPROFEN 400 MG/1
800 TABLET ORAL
Status: DISCONTINUED | OUTPATIENT
Start: 2021-07-22 | End: 2021-07-22 | Stop reason: HOSPADM

## 2021-07-22 RX ORDER — OXYCODONE HYDROCHLORIDE 5 MG/1
5 TABLET ORAL
Status: DISCONTINUED | OUTPATIENT
Start: 2021-07-22 | End: 2021-07-22 | Stop reason: HOSPADM

## 2021-07-22 RX ORDER — OXYCODONE HYDROCHLORIDE 10 MG/1
10 TABLET ORAL
Qty: 16 TABLET | Refills: 0 | Status: SHIPPED | OUTPATIENT
Start: 2021-07-22 | End: 2021-07-27

## 2021-07-22 RX ORDER — OXYCODONE HYDROCHLORIDE 5 MG/1
10 TABLET ORAL
Status: DISCONTINUED | OUTPATIENT
Start: 2021-07-22 | End: 2021-07-22 | Stop reason: HOSPADM

## 2021-07-22 RX ORDER — ONDANSETRON 4 MG/1
4 TABLET, ORALLY DISINTEGRATING ORAL
Qty: 12 TABLET | Refills: 1 | Status: SHIPPED | OUTPATIENT
Start: 2021-07-22

## 2021-07-22 RX ORDER — PANTOPRAZOLE SODIUM 40 MG/1
40 TABLET, DELAYED RELEASE ORAL DAILY
Qty: 30 TABLET | Refills: 2 | Status: SHIPPED | OUTPATIENT
Start: 2021-07-22

## 2021-07-22 RX ADMIN — SUCRALFATE 1 G: 1 TABLET ORAL at 06:10

## 2021-07-22 RX ADMIN — SODIUM CHLORIDE, POTASSIUM CHLORIDE, SODIUM LACTATE AND CALCIUM CHLORIDE 100 ML/HR: 600; 310; 30; 20 INJECTION, SOLUTION INTRAVENOUS at 03:09

## 2021-07-22 RX ADMIN — HYDROMORPHONE HYDROCHLORIDE 1 MG: 1 INJECTION, SOLUTION INTRAMUSCULAR; INTRAVENOUS; SUBCUTANEOUS at 03:17

## 2021-07-22 RX ADMIN — ONDANSETRON 4 MG: 2 INJECTION INTRAMUSCULAR; INTRAVENOUS at 03:09

## 2021-07-22 RX ADMIN — SUCRALFATE 1 G: 1 TABLET ORAL at 12:56

## 2021-07-22 RX ADMIN — KETOROLAC TROMETHAMINE 30 MG: 30 INJECTION, SOLUTION INTRAMUSCULAR; INTRAVENOUS at 06:10

## 2021-07-22 RX ADMIN — OXYCODONE 5 MG: 5 TABLET ORAL at 14:38

## 2021-07-22 RX ADMIN — OXYCODONE 5 MG: 5 TABLET ORAL at 11:37

## 2021-07-22 NOTE — DISCHARGE SUMMARY
Discharge Summary    Patient: Kait Donnelly               Sex: female          DOA: 7/18/2021 11:26 AM       YOB: 1985      Age:  39 y.o.        LOS:  LOS: 4 days                Discharge Date:      Admission Diagnoses: Bowel obstruction ([facility] [address] [O29.370]    Discharge Diagnoses:  Same    Procedure:  Procedure(s): Wound Exploration, Reduction incarcerated small bowel, Incisional Hernia repair with mesh    Discharge Condition: Good    Hospital Course: Unremarkable operative procedure. Discharge to home in stable condition. Consults: None    Significant Diagnostic Studies: See full electronic record. Discharge Medications:     Current Discharge Medication List      START taking these medications    Details   oxyCODONE IR (ROXICODONE) 10 mg tab immediate release tablet Take 1 Tablet by mouth every four (4) hours as needed for Pain for up to 5 days. Max Daily Amount: 60 mg. Indications: pain, Acute post-operative pain  Qty: 16 Tablet, Refills: 0    Associated Diagnoses: SBO (small bowel obstruction) ([facility])      ondansetron (ZOFRAN ODT) 4 mg disintegrating tablet Take 1 Tablet by mouth every six (6) hours as needed for Nausea. Indications: prevent nausea and vomiting after surgery  Qty: 12 Tablet, Refills: 1      pantoprazole (PROTONIX) 40 mg tablet Take 1 Tablet by mouth daily. Indications: gastroesophageal reflux disease  Qty: 30 Tablet, Refills: 2         CONTINUE these medications which have NOT CHANGED    Details   ibuprofen (MOTRIN) 800 mg tablet Take 1 Tablet by mouth every eight (8) hours. Indications: pain  Qty: 21 Tablet, Refills: 1      cetirizine (ZyrTEC) 10 mg tablet Take 10 mg by mouth daily as needed for Allergies. montelukast (Singulair) 10 mg tablet Take 10 mg by mouth every evening. valACYclovir (Valtrex) 500 mg tablet Take 500 mg by mouth daily.  Indications: suppression of recurrent Herpes simplex infection in HIV      gabapentin (NEURONTIN) 100 mg capsule Take 100 mg by mouth as needed for Pain. naproxen sodium (Aleve) 220 mg tablet Take 220 mg by mouth daily as needed. medroxyprogesterone acetate (DEPO-PROVERA IM) by IntraMUSCular route every three (3) months. STOP taking these medications       oxyCODONE-acetaminophen (PERCOCET) 5-325 mg per tablet Comments:   Reason for Stopping:               Activity/Diet/Wound Care: See patient administered discharge instructions.     Follow-up: 2 weeks    Cassidy Burgos MD  [facility]  Office:  [phone]  Fax:  [phone]

## 2021-07-22 NOTE — PROGRESS NOTES
7/22/2021   2:13 PM  Update via chart review,  pt is continuing to require medical management for small bowel obstruction, s/p Posterior rectus rent with an incarcerated small-bowel loop. Transition of Care Plan:                    RUR 5%  LOS 4 Days  1. Medical  Management continues, POD # 3 Wound Exploration, Reduction incarcerated small bowel, Incisional Hernia repair with mesh  2. CM to follow through for treatment/respone  3. DC when stable to home w/ family assistance, likely today pending medical clearance  4. Outpatient f/u surgery, PCP  5. Family will transport  Care Management Interventions  PCP Verified by CM: Yes (Gil Cabrera MD; last visit > 30 days)  Palliative Care Criteria Met (RRAT>21 & CHF Dx)?: No  Mode of Transport at Discharge: Self (Family)  Physical Therapy Consult: Yes  Occupational Therapy Consult: No  Speech Therapy Consult: No  Current Support Network: Relative's Home (pt lives w/ parents in pvt residence, baseline pt is ambulatory,iADLs, drives)  Confirm Follow Up Transport: Family  Discharge Location  Discharge Placement: Home with outpatient services  EMMANUEL Pastor       10:14 AM  CM spoke w/ Abel Starr CM for [employer], updated on DC plan to home w/ outpatient f/u surgery, no CM DC needs.   EMMANUEL Pastor

## 2021-07-22 NOTE — PROGRESS NOTES
Physical Therapy    1405 Pt anticipating discharge, no questions regarding mobility. No additional PT needs at this time    Thony Garcia. Betito Pederson, PTA

## 2021-07-22 NOTE — PROGRESS NOTES
Patient lost IV access and did not receive morning meds. Physician notified. 3 Rn's attempted IV and picc team contacted to place IV. Verbal orders to remove staples and zuhair drain. New dressing and steri strips applied over incision.

## 2021-07-22 NOTE — PROGRESS NOTES
Physician Progress Note      Carolina Kim  CSN #:                  [id number]  :                       1985  ADMIT DATE:       2021 11:26 AM  DISCH DATE:  RESPONDING  PROVIDER #:        Nato Gustafson MD          QUERY [facility] Bla Afternoon    This patient admitted for small bowel obstruction. The patient is also noted to have just had a myomectomy  5 days PTA. If possible, please document in progress notes and discharge summary:    The medical record reflects the following:  Risk Factors:  PT had just been discharged few days prior to this admission s/p myomectomy 5 days ago. Clinical Indicators: Per patient since she was discharged from the myomectomy, she has cont. with abd. pain as well ans nausea and vomiting and not able to keep her pain meds or her antinausea meds down. CT of the abd. \"Subcutaneous edema and air most likely postoperative. mx. distended loops of small bowel which hare fluid filled and air filled in the abd. and pelvis which are consistent with small bowel obstruction. There is a rectus muscles suspicious for no dilated loop of small bowel in a hernia which my be response for the small bowel obstruction and clinical correlation would be helpful. Treatment: To OR for Wound exploration, reduction incarcerated small bowel, incisional hernia repair with mesh. Thank you,  Sanket Henning, [place]  [phone]  Options provided:  -- Small bowel obstruction, incarcerated small bowel , incisional hernia  as a postoperative complication  -- Small bowel obstruction, incarcerated small bowel , incisional hernia was as an expected/inherent condition that occurred postoperatively and not a complication  -- Small bowel obstruction incarcerated small bowel , incisional hernia  related to other incidental risk factor from the ) occurring following surgery and not a complication, Please document incidental risk factor.   -- Other - I will add my own diagnosis  -- Disagree - Not applicable / Not valid  -- Disagree - Clinically unable to determine / Unknown  -- Refer to Clinical Documentation Reviewer    PROVIDER RESPONSE TEXT:    Patient has small bowel obstruction,, incarcerated small bowel as a postoperative complication.     Query created by: Cait Arce on 7/20/2021 2:57 PM      Electronically signed by:  Yamilet Sher MD 7/22/2021 8:53 AM

## 2021-07-22 NOTE — PROGRESS NOTES
0730- Bedside and Verbal shift change report given to Amy Royal (oncoming nurse) by [address] (offgoing nurse). Report included the following information SBAR, Intake/Output and Recent Results.

## 2021-07-24 LAB
BACTERIA SPEC CULT: NORMAL
BACTERIA SPEC CULT: NORMAL
SERVICE CMNT-IMP: NORMAL
SERVICE CMNT-IMP: NORMAL

## 2021-08-12 NOTE — PERIOP NOTES
08/12/21, 8:29 AM    [facility]est[facility] Services Chart Audit - Patient Encounter Reviewed

## 2022-03-18 PROBLEM — D25.9 FIBROID UTERUS: Status: ACTIVE | Noted: 2021-07-14

## 2022-03-19 PROBLEM — K56.609 BOWEL OBSTRUCTION (HCC): Status: ACTIVE | Noted: 2021-07-18

## 2022-08-03 ENCOUNTER — TELEPHONE (OUTPATIENT)
Dept: OBGYN CLINIC | Age: 37
End: 2022-08-03

## 2022-08-03 NOTE — TELEPHONE ENCOUNTER
Patient was offered an earlier appt on 08/05/22 and 08/08/22 and neither of those dates worked for her.

## 2022-08-04 NOTE — TELEPHONE ENCOUNTER
Left a message on the patient's voicemail that if she has external vaginal itching she could try Vagisil but if her itching is internal that cannot be inserted.

## 2022-08-04 NOTE — TELEPHONE ENCOUNTER
Patient said monistat cream not working can you suggest something over the counter to get per patient.

## 2022-11-18 ENCOUNTER — HOSPITAL ENCOUNTER (EMERGENCY)
Age: 37
Discharge: HOME | End: 2022-11-18
Attending: FAMILY MEDICINE | Admitting: FAMILY MEDICINE
Payer: OTHER GOVERNMENT

## 2022-11-18 VITALS
DIASTOLIC BLOOD PRESSURE: 76 MMHG | TEMPERATURE: 97.8 F | BODY MASS INDEX: 32.96 KG/M2 | SYSTOLIC BLOOD PRESSURE: 128 MMHG | RESPIRATION RATE: 17 BRPM | HEART RATE: 80 BPM | OXYGEN SATURATION: 100 % | HEIGHT: 67 IN | WEIGHT: 210 LBS

## 2022-11-18 DIAGNOSIS — J06.9 VIRAL URI WITH COUGH: Primary | ICD-10-CM

## 2022-11-18 PROCEDURE — 93005 ELECTROCARDIOGRAM TRACING: CPT

## 2022-11-18 PROCEDURE — 99283 EMERGENCY DEPT VISIT LOW MDM: CPT

## 2022-11-18 RX ORDER — PREDNISONE 20 MG/1
40 TABLET ORAL DAILY
Qty: 10 TABLET | Refills: 0 | Status: SHIPPED | OUTPATIENT
Start: 2022-11-18 | End: 2022-11-23

## 2022-11-18 NOTE — Clinical Note
Eva [address] [place] [id number]  [phone]    Work/School Note    Date: 11/18/2022    To Whom It May concern:      Edmund Blue was seen and treated today in the emergency room by the following provider(s):  Attending Provider: Julia Langley DO. Edmund Blue is excused from work/school on 11/18/22. She is clear to return to work/school on 11/19/22.         Sincerely,          Adryan Mcqueen DO

## 2022-11-18 NOTE — ED PROVIDER NOTES
EMERGENCY DEPARTMENT HISTORY AND PHYSICAL EXAM      Date: 11/18/2022  Patient Name: Mono Redd    History of Presenting Illness     Chief complaint: Cough, body aches    History Provided By: Patient    HPI: Mono Redd, 40 y.o. female presents to the ED with CC of cough, body aches. Recent onset of symptoms. She just had an influenza a exposure. Cough is dry. Occasional chest pain while coughing, resolves when not coughing. Left-sided chest wall tender when pressing on this area. Patient denies alleviating nor aggravating factors. Patient denies SOB, or any neurological symptoms. Patient denies any other symptoms nor concerns at this time  Past History     Past Medical History:  Past Medical History:   Diagnosis Date    Gestational HTN     History of anemia     History of seasonal allergies        Allergies: Allergies   Allergen Reactions    Hydrocodone Nausea Only       Review of Systems   Vital signs and nursing notes reviewed  Review of Systems   Constitutional:  Negative for activity change, appetite change, chills, fatigue and fever. HENT:  Positive for congestion. Negative for sore throat. Eyes:  Negative for photophobia and visual disturbance. Respiratory:  Positive for cough. Negative for shortness of breath and wheezing. Cardiovascular:  Negative for chest pain, palpitations and leg swelling. Gastrointestinal:  Negative for abdominal pain, diarrhea, nausea and vomiting. Endocrine: Negative for cold intolerance and heat intolerance. Musculoskeletal:  Negative for gait problem and joint swelling. Skin:  Negative for color change and rash. Neurological:  Negative for dizziness and headaches. Physical Exam   Visit Vitals  BP (!) 139/103 (BP Patient Position: At rest)   Pulse 80   Temp 97.8 °F (36.6 °C)   Resp 17   Ht 5' 7\" (1.702 m)   Wt 95.3 kg (210 lb)   SpO2 100%   BMI 32.89 kg/m²     CONSTITUTIONAL: Alert, in no distress.  Appears stated age.,  Nontoxic  HEAD: Normocephalic, atraumatic, uvula midline, no muffled voice, no findings of peritonsillar abscess, tolerating secretions with ease  EYES: EOM intact. No conjunctival injection or scleral icterus  Neck:  Supple. No meningismus,  RESP: No increased work of breathing, lungs clear to auscultation bilaterally. No wheezes rales nor rhonchi  CV: Heart is regular rate and rhythm, no murmurs, no JVD, capillary refill less than 2 seconds  NEURO: Moves all extremities spontaneously. No motor nor sensory deficits. No focal neurologic deficits. PSYCH: Normal mood, normal affect  MSK: Reproducible chest wall tenderness left costochondral junction    ED course/medical decision making       Patient presented to the emergency department with the aforementioned chief complaint. On examination the patient is nontoxic and overall well-appearing. No hypoxia. Well-hydrated on exam.  Lungs are clear to auscultation bilaterally. No increased work of breathing. Patient has reproducible left-sided chest wall pain consistent with costochondritis. Low suspicion ACS. COVID-19 testing was not conducted. Patient was given quarantine/isolation recommendations and agrees with the plan to be discharged home. They were provided instructions to return to the emergency department with any difficulty breathing, chest pain, altered mentation or any other new or worsening symptoms. Patient was discharged home in stable and ambulatory condition. Critical Care Time: None    Disposition:  DISCHARGE NOTE:  The pt is ready for discharge. The pt's signs, symptoms, diagnosis, and discharge instructions have been discussed and pt has conveyed their understanding. The pt is to follow up as recommended or return to ER should their symptoms worsen. Plan has been discussed and pt is in agreement. PLAN:  1. Current Discharge Medication List        2.    Follow-up Information       Follow up With Specialties Details Why Contact Info Your primary care doctor  Schedule an appointment as soon as possible for a visit in 2 days            3. Take Tylenol or Ibuprofen as needed  4. Drink plenty of fluids  5. Return to ED if worse especially if any shortness of breath, chest pain or altered mentation. Diagnosis     Clinical Impression:   1. Viral URI with cough            Please note that this dictation was completed with [employer], the computer voice recognition software. Quite often unanticipated grammatical, syntax, homophones, and other interpretive errors are inadvertently transcribed by the computer software. Please disregards these errors. Please excuse any errors that have escaped final proofreading.

## 2022-11-18 NOTE — LETTER
[address]  [address] [facility] [address]  [phone]    Work/School Note    Date: 11/18/2022    To Whom It May concern:    dEmund Bule was seen and treated today in the emergency room by the following provider(s):  Attending Provider: Julia Langley DO. Edmund Blue is excused from work/school on 11/18/2022 through 11/19/2022. She is medically clear to return to work/school on 11/20/2022.          Sincerely,          Selestino Favre, RN

## 2022-11-19 LAB
ATRIAL RATE: 86 BPM
CALCULATED P AXIS, ECG09: 40 DEGREES
CALCULATED R AXIS, ECG10: -18 DEGREES
CALCULATED T AXIS, ECG11: -22 DEGREES
DIAGNOSIS, 93000: NORMAL
P-R INTERVAL, ECG05: 142 MS
Q-T INTERVAL, ECG07: 358 MS
QRS DURATION, ECG06: 76 MS
QTC CALCULATION (BEZET), ECG08: 428 MS
VENTRICULAR RATE, ECG03: 86 BPM

## 2023-05-19 RX ORDER — VALACYCLOVIR HYDROCHLORIDE 500 MG/1
500 TABLET, FILM COATED ORAL DAILY
COMMUNITY
End: 2023-05-23 | Stop reason: SDUPTHER

## 2023-05-19 RX ORDER — ONDANSETRON 4 MG/1
4 TABLET, ORALLY DISINTEGRATING ORAL EVERY 6 HOURS PRN
COMMUNITY
Start: 2021-07-22

## 2023-05-19 RX ORDER — IBUPROFEN 800 MG/1
800 TABLET ORAL EVERY 8 HOURS
COMMUNITY
Start: 2021-07-16

## 2023-05-19 RX ORDER — PANTOPRAZOLE SODIUM 40 MG/1
40 TABLET, DELAYED RELEASE ORAL DAILY
COMMUNITY
Start: 2021-07-22

## 2023-05-19 RX ORDER — NAPROXEN SODIUM 220 MG
220 TABLET ORAL DAILY PRN
COMMUNITY

## 2023-05-19 RX ORDER — MONTELUKAST SODIUM 10 MG/1
10 TABLET ORAL EVERY EVENING
COMMUNITY

## 2023-05-19 RX ORDER — MEDROXYPROGESTERONE ACETATE 150 MG/ML
INJECTION, SUSPENSION INTRAMUSCULAR
COMMUNITY

## 2023-05-19 RX ORDER — GABAPENTIN 100 MG/1
100 CAPSULE ORAL PRN
COMMUNITY

## 2023-05-19 RX ORDER — CETIRIZINE HYDROCHLORIDE 10 MG/1
10 TABLET ORAL DAILY PRN
COMMUNITY

## 2023-05-23 ENCOUNTER — OFFICE VISIT (OUTPATIENT)
Age: 38
End: 2023-05-23
Payer: COMMERCIAL

## 2023-05-23 VITALS
DIASTOLIC BLOOD PRESSURE: 90 MMHG | WEIGHT: 239.38 LBS | HEIGHT: 67 IN | BODY MASS INDEX: 37.57 KG/M2 | SYSTOLIC BLOOD PRESSURE: 150 MMHG

## 2023-05-23 DIAGNOSIS — L02.91 CUTANEOUS ABSCESS, UNSPECIFIED SITE: ICD-10-CM

## 2023-05-23 DIAGNOSIS — D21.9 FIBROIDS: ICD-10-CM

## 2023-05-23 DIAGNOSIS — Z11.3 SCREENING FOR VENEREAL DISEASE: ICD-10-CM

## 2023-05-23 DIAGNOSIS — B00.9 HSV (HERPES SIMPLEX VIRUS) INFECTION: ICD-10-CM

## 2023-05-23 DIAGNOSIS — Z11.51 SCREENING FOR HUMAN PAPILLOMAVIRUS: ICD-10-CM

## 2023-05-23 DIAGNOSIS — Z01.419 PAP SMEAR, AS PART OF ROUTINE GYNECOLOGICAL EXAMINATION: Primary | ICD-10-CM

## 2023-05-23 PROBLEM — J30.9 ALLERGIC RHINITIS: Status: ACTIVE | Noted: 2023-05-23

## 2023-05-23 PROBLEM — K21.9 GASTRO-ESOPHAGEAL REFLUX DISEASE WITHOUT ESOPHAGITIS: Status: ACTIVE | Noted: 2023-05-23

## 2023-05-23 PROBLEM — E66.09 OBESITY DUE TO EXCESS CALORIES: Status: ACTIVE | Noted: 2023-05-23

## 2023-05-23 PROBLEM — I10 ESSENTIAL HYPERTENSION: Status: ACTIVE | Noted: 2023-05-23

## 2023-05-23 PROBLEM — M25.562 PAIN IN LEFT KNEE: Status: ACTIVE | Noted: 2023-05-23

## 2023-05-23 PROCEDURE — 3077F SYST BP >= 140 MM HG: CPT | Performed by: OBSTETRICS & GYNECOLOGY

## 2023-05-23 PROCEDURE — 99395 PREV VISIT EST AGE 18-39: CPT | Performed by: OBSTETRICS & GYNECOLOGY

## 2023-05-23 PROCEDURE — 3080F DIAST BP >= 90 MM HG: CPT | Performed by: OBSTETRICS & GYNECOLOGY

## 2023-05-23 RX ORDER — ETONOGESTREL AND ETHINYL ESTRADIOL 11.7; 2.7 MG/1; MG/1
1 INSERT, EXTENDED RELEASE VAGINAL
Qty: 3 EACH | Refills: 5 | Status: SHIPPED | OUTPATIENT
Start: 2023-05-23

## 2023-05-23 RX ORDER — CEPHALEXIN 500 MG/1
500 CAPSULE ORAL 3 TIMES DAILY
Qty: 21 CAPSULE | Refills: 0 | Status: SHIPPED | OUTPATIENT
Start: 2023-05-23 | End: 2023-05-30

## 2023-05-23 RX ORDER — IPRATROPIUM BROMIDE 42 UG/1
SPRAY, METERED NASAL
COMMUNITY
Start: 2023-03-24

## 2023-05-23 RX ORDER — SULFAMETHOXAZOLE AND TRIMETHOPRIM 800; 160 MG/1; MG/1
TABLET ORAL
COMMUNITY
Start: 2023-04-19

## 2023-05-23 RX ORDER — SEMAGLUTIDE 0.25 MG/.5ML
INJECTION, SOLUTION SUBCUTANEOUS
COMMUNITY
Start: 2023-04-26

## 2023-05-23 RX ORDER — VALACYCLOVIR HYDROCHLORIDE 500 MG/1
500 TABLET, FILM COATED ORAL DAILY
Qty: 30 TABLET | Refills: 11 | Status: SHIPPED | OUTPATIENT
Start: 2023-05-23

## 2023-05-23 ASSESSMENT — SOCIAL DETERMINANTS OF HEALTH (SDOH)

## 2023-05-23 NOTE — PROGRESS NOTES
Noé Haynes is a 40 y.o. female who presents today for the following:  Chief Complaint   Patient presents with    Annual Exam     Patient presents for annual exam with complaints of lump in left breast x 2 days //MKeeley     Breast Problem        Allergies   Allergen Reactions    Penicillins      Other reaction(s): Unknown    Hydrocodone Nausea Only       Current Outpatient Medications   Medication Sig Dispense Refill    valACYclovir (VALTREX) 500 MG tablet Take 1 tablet by mouth daily 30 tablet 11    etonogestrel-ethinyl estradiol (NUVARING) 0.12-0.015 MG/24HR vaginal ring Place 1 each vaginally every 28 days Insert one (1) ring vaginally and leave in place for three (4) weeks 3 each 5    cephALEXin (KEFLEX) 500 MG capsule Take 1 capsule by mouth 3 times daily for 7 days 21 capsule 0    WEGOVY 0.25 MG/0.5ML SOAJ SC injection INJECT 1 SYRINGE SUBCUTANEOUSLY ONCE A WEEK      ipratropium (ATROVENT) 0.06 % nasal spray       sulfamethoxazole-trimethoprim (BACTRIM DS;SEPTRA DS) 800-160 MG per tablet       medroxyPROGESTERone (DEPO-PROVERA) 150 MG/ML injection Inject into the muscle      cetirizine (ZYRTEC) 10 MG tablet Take 1 tablet by mouth daily as needed      gabapentin (NEURONTIN) 100 MG capsule Take 1 capsule by mouth as needed. ibuprofen (ADVIL;MOTRIN) 800 MG tablet Take 1 tablet by mouth in the morning and 1 tablet at noon and 1 tablet in the evening.      montelukast (SINGULAIR) 10 MG tablet Take 1 tablet by mouth every evening      naproxen sodium (ANAPROX) 220 MG tablet Take 1 tablet by mouth daily as needed      ondansetron (ZOFRAN-ODT) 4 MG disintegrating tablet Take 1 tablet by mouth every 6 hours as needed      pantoprazole (PROTONIX) 40 MG tablet Take 1 tablet by mouth daily       No current facility-administered medications for this visit.        Past Medical History:   Diagnosis Date    Gestational HTN     History of anemia     History of seasonal allergies        Past Surgical History:

## 2023-05-29 LAB
., LABCORP: NORMAL
C TRACH RRNA CVX QL NAA+PROBE: NEGATIVE
CYTOLOGIST CVX/VAG CYTO: NORMAL
CYTOLOGY CVX/VAG DOC CYTO: NORMAL
CYTOLOGY CVX/VAG DOC THIN PREP: NORMAL
DX ICD CODE: NORMAL
Lab: NORMAL
N GONORRHOEA RRNA CVX QL NAA+PROBE: NEGATIVE
OTHER STN SPEC: NORMAL
STAT OF ADQ CVX/VAG CYTO-IMP: NORMAL
T VAGINALIS RRNA SPEC QL NAA+PROBE: NEGATIVE

## 2023-07-06 ENCOUNTER — TELEPHONE (OUTPATIENT)
Age: 38
End: 2023-07-06

## 2023-07-06 DIAGNOSIS — D21.9 FIBROIDS: Primary | ICD-10-CM

## 2023-07-06 DIAGNOSIS — N92.6 IRREGULAR BLEEDING: ICD-10-CM

## 2023-07-06 RX ORDER — ETONOGESTREL/ETHINYL ESTRADIOL .12-.015MG
1 RING, VAGINAL VAGINAL
Qty: 3 EACH | Refills: 3 | Status: SHIPPED | OUTPATIENT
Start: 2023-07-06

## 2023-07-06 NOTE — TELEPHONE ENCOUNTER
Patient walked in to schedule an appointment with Dr Allan White. She states she was unhappy with her visit with Dr Mimi Leung. Per the patient, he never introduced himself, there was a student present and she was never asked if it was okay. She states the student tried to obtain her pap and it was uncomfortable so Dr Mimi Leung did the exam.  She states she has been experiencing some bleeding and cramping. She had a myomectomy in 2021 and she feels as if her symptoms were the same as she is experiencing now. She has scheduled an appointment with Dr Allan White on 08/31/23. Ultrasound scheduled on 07/18/23 in the office.

## 2023-07-07 ENCOUNTER — TELEPHONE (OUTPATIENT)
Age: 38
End: 2023-07-07

## 2023-07-11 ENCOUNTER — TELEPHONE (OUTPATIENT)
Age: 38
End: 2023-07-11

## 2023-07-11 DIAGNOSIS — R10.2 PELVIC CRAMPING: ICD-10-CM

## 2023-07-11 DIAGNOSIS — N92.0 MENORRHAGIA WITH REGULAR CYCLE: Primary | ICD-10-CM

## 2023-07-11 NOTE — TELEPHONE ENCOUNTER
Patient called stating her bleeding has continued along with the cramping. Appointment for ultrasound rescheduled from 07/18/23 to 07/12/23.

## 2023-07-12 DIAGNOSIS — N93.9 VAGINAL BLEEDING: Primary | ICD-10-CM

## 2023-07-12 DIAGNOSIS — N93.9 VAGINAL BLEEDING: ICD-10-CM

## 2023-07-13 LAB
ERYTHROCYTE [DISTWIDTH] IN BLOOD BY AUTOMATED COUNT: 12.7 % (ref 11.7–15.4)
HCT VFR BLD AUTO: 34.5 % (ref 34–46.6)
HGB BLD-MCNC: 11.2 G/DL (ref 11.1–15.9)
MCH RBC QN AUTO: 27.8 PG (ref 26.6–33)
MCHC RBC AUTO-ENTMCNC: 32.5 G/DL (ref 31.5–35.7)
MCV RBC AUTO: 86 FL (ref 79–97)
PLATELET # BLD AUTO: 347 X10E3/UL (ref 150–450)
RBC # BLD AUTO: 4.03 X10E6/UL (ref 3.77–5.28)
WBC # BLD AUTO: 7.8 X10E3/UL (ref 3.4–10.8)

## 2023-07-18 ENCOUNTER — OFFICE VISIT (OUTPATIENT)
Age: 38
End: 2023-07-18
Payer: COMMERCIAL

## 2023-07-18 ENCOUNTER — TELEPHONE (OUTPATIENT)
Age: 38
End: 2023-07-18

## 2023-07-18 VITALS
RESPIRATION RATE: 16 BRPM | HEIGHT: 67 IN | BODY MASS INDEX: 36.41 KG/M2 | OXYGEN SATURATION: 100 % | HEART RATE: 99 BPM | SYSTOLIC BLOOD PRESSURE: 139 MMHG | DIASTOLIC BLOOD PRESSURE: 87 MMHG | WEIGHT: 232 LBS

## 2023-07-18 DIAGNOSIS — Z01.818 PRE-PROCEDURAL EXAMINATION: ICD-10-CM

## 2023-07-18 DIAGNOSIS — N92.6 IRREGULAR BLEEDING: Primary | ICD-10-CM

## 2023-07-18 PROCEDURE — 3078F DIAST BP <80 MM HG: CPT | Performed by: OBSTETRICS & GYNECOLOGY

## 2023-07-18 PROCEDURE — 99214 OFFICE O/P EST MOD 30 MIN: CPT | Performed by: OBSTETRICS & GYNECOLOGY

## 2023-07-18 PROCEDURE — 3074F SYST BP LT 130 MM HG: CPT | Performed by: OBSTETRICS & GYNECOLOGY

## 2023-07-18 RX ORDER — NORGESTIMATE AND ETHINYL ESTRADIOL 0.25-0.035
1 KIT ORAL DAILY
Qty: 1 PACKET | Refills: 3 | Status: SHIPPED | OUTPATIENT
Start: 2023-07-18

## 2023-07-18 RX ORDER — MISOPROSTOL 100 UG/1
200 TABLET ORAL ONCE
Qty: 1 TABLET | Refills: 0 | Status: SHIPPED | OUTPATIENT
Start: 2023-07-18 | End: 2023-07-18

## 2023-07-18 NOTE — TELEPHONE ENCOUNTER
Received a call from the pharmacist at [facility] to clarify directions for the Misoprostol for the patient. Per Dr Arlyn Rinaldi, the patient is to receive 100 mg tablets #2 as a one time dose.

## 2023-09-29 ENCOUNTER — PROCEDURE VISIT (OUTPATIENT)
Age: 38
End: 2023-09-29

## 2023-09-29 VITALS
HEIGHT: 67 IN | WEIGHT: 229.5 LBS | BODY MASS INDEX: 36.02 KG/M2 | DIASTOLIC BLOOD PRESSURE: 81 MMHG | SYSTOLIC BLOOD PRESSURE: 133 MMHG

## 2023-09-29 DIAGNOSIS — N76.0 ACUTE VAGINITIS: ICD-10-CM

## 2023-09-29 DIAGNOSIS — Z30.431 IUD CHECK UP: ICD-10-CM

## 2023-09-29 DIAGNOSIS — N92.6 IRREGULAR BLEEDING: Primary | ICD-10-CM

## 2023-09-29 LAB
HCG, PREGNANCY, URINE, POC: NEGATIVE
VALID INTERNAL CONTROL, POC: YES

## 2023-09-29 RX ORDER — ALBUTEROL SULFATE 90 UG/1
AEROSOL, METERED RESPIRATORY (INHALATION)
COMMUNITY
Start: 2022-08-03

## 2023-09-29 RX ORDER — LEVONORGESTREL 52 MG/1
INTRAUTERINE DEVICE INTRAUTERINE
COMMUNITY
Start: 2023-07-21 | End: 2023-09-29

## 2023-09-29 RX ORDER — IBUPROFEN 800 MG/1
TABLET ORAL
COMMUNITY
Start: 2023-08-18

## 2023-09-30 NOTE — PROGRESS NOTES
Andres Farrell is a 45 y.o. female, No obstetric history on file., No LMP recorded. (Menstrual status: Other - See Notes). , who presents today for the following:  Chief Complaint   Patient presents with    Contraception     Pt presents for IUD insertion //MKeeley         Allergies   Allergen Reactions    Penicillins      Other reaction(s): Unknown    Hydrocodone Nausea Only       Current Outpatient Medications   Medication Sig Dispense Refill    ibuprofen (ADVIL;MOTRIN) 800 MG tablet 1 tablet with food or milk as needed Orally every 8 hrs for 30 days      albuterol sulfate HFA (PROVENTIL;VENTOLIN;PROAIR) 108 (90 Base) MCG/ACT inhaler 2 puff as needed Inhalation every 4 hrs for 10 days      miSOPROStol (CYTOTEC) 100 MCG tablet Take 2 tablets by mouth once for 1 dose 1 tablet 0    WEGOVY 0.25 MG/0.5ML SOAJ SC injection INJECT 1 SYRINGE SUBCUTANEOUSLY ONCE A WEEK      ipratropium (ATROVENT) 0.06 % nasal spray       sulfamethoxazole-trimethoprim (BACTRIM DS;SEPTRA DS) 800-160 MG per tablet       valACYclovir (VALTREX) 500 MG tablet Take 1 tablet by mouth daily 30 tablet 11    cetirizine (ZYRTEC) 10 MG tablet Take 1 tablet by mouth daily as needed      gabapentin (NEURONTIN) 100 MG capsule Take 1 capsule by mouth as needed. ibuprofen (ADVIL;MOTRIN) 800 MG tablet Take 1 tablet by mouth in the morning and 1 tablet at noon and 1 tablet in the evening.      montelukast (SINGULAIR) 10 MG tablet Take 1 tablet by mouth every evening      naproxen sodium (ANAPROX) 220 MG tablet Take 1 tablet by mouth daily as needed      ondansetron (ZOFRAN-ODT) 4 MG disintegrating tablet Take 1 tablet by mouth every 6 hours as needed      pantoprazole (PROTONIX) 40 MG tablet Take 1 tablet by mouth daily       No current facility-administered medications for this visit.          Past Medical History:   Diagnosis Date    Gestational HTN     History of anemia     History of seasonal allergies        Past Surgical History:   Procedure

## 2023-10-04 LAB
A VAGINAE DNA VAG QL NAA+PROBE: ABNORMAL SCORE
BVAB2 DNA VAG QL NAA+PROBE: ABNORMAL SCORE
C ALBICANS DNA VAG QL NAA+PROBE: NEGATIVE
C GLABRATA DNA VAG QL NAA+PROBE: NEGATIVE
C TRACH DNA VAG QL NAA+PROBE: NEGATIVE
MEGA1 DNA VAG QL NAA+PROBE: ABNORMAL SCORE
N GONORRHOEA DNA VAG QL NAA+PROBE: NEGATIVE
T VAGINALIS DNA VAG QL NAA+PROBE: NEGATIVE

## 2023-10-04 RX ORDER — METRONIDAZOLE 7.5 MG/G
1 GEL VAGINAL DAILY
Qty: 70 G | Refills: 0 | Status: SHIPPED | OUTPATIENT
Start: 2023-10-04 | End: 2023-10-09

## 2023-10-19 ENCOUNTER — TELEPHONE (OUTPATIENT)
Age: 38
End: 2023-10-19

## 2023-10-19 NOTE — TELEPHONE ENCOUNTER
Spoke with patient who reports that she she was suppose to come back to have iud checked but now is having some stomach pain and feels like contractions with some sharp pains. Advised patient that can go to the imaging department to have imaging done to check placement of IUD. Advised I can write the order for her to go outside of office to have done. She reports she will come on Monday. Appt scheduled  advised that if something changes to go to urgent care or Er for evaluation.

## 2023-11-15 ENCOUNTER — TELEPHONE (OUTPATIENT)
Age: 38
End: 2023-11-15

## 2023-11-15 DIAGNOSIS — N92.6 IRREGULAR BLEEDING: Primary | ICD-10-CM

## 2023-11-15 RX ORDER — NORGESTIMATE AND ETHINYL ESTRADIOL 0.25-0.035
1 KIT ORAL DAILY
Qty: 1 PACKET | Refills: 0 | Status: SHIPPED | OUTPATIENT
Start: 2023-11-15

## 2023-11-15 NOTE — TELEPHONE ENCOUNTER
Patient contacted the office reports that she is bleeding with the iud requesting something be sent to help due to experiencing some cramping. Rx sent to her pharmacy. Patient is aware that one pack only no refills. She is also aware that with the device that she may experience some breakthrough bleeding.

## 2024-01-25 DIAGNOSIS — B37.9 YEAST INFECTION: Primary | ICD-10-CM

## 2024-01-25 RX ORDER — FLUCONAZOLE 150 MG/1
150 TABLET ORAL ONCE
Qty: 1 TABLET | Refills: 0 | Status: SHIPPED | OUTPATIENT
Start: 2024-01-25 | End: 2024-01-25

## 2024-02-14 ENCOUNTER — OFFICE VISIT (OUTPATIENT)
Age: 39
End: 2024-02-14
Payer: COMMERCIAL

## 2024-02-14 VITALS
BODY MASS INDEX: 35.31 KG/M2 | HEIGHT: 67 IN | DIASTOLIC BLOOD PRESSURE: 93 MMHG | RESPIRATION RATE: 18 BRPM | SYSTOLIC BLOOD PRESSURE: 144 MMHG | HEART RATE: 79 BPM | OXYGEN SATURATION: 100 % | WEIGHT: 225 LBS

## 2024-02-14 DIAGNOSIS — Z97.5 BREAKTHROUGH BLEEDING ASSOCIATED WITH INTRAUTERINE DEVICE (IUD): ICD-10-CM

## 2024-02-14 DIAGNOSIS — N92.1 BREAKTHROUGH BLEEDING ASSOCIATED WITH INTRAUTERINE DEVICE (IUD): ICD-10-CM

## 2024-02-14 DIAGNOSIS — N89.8 VAGINAL DISCHARGE: ICD-10-CM

## 2024-02-14 DIAGNOSIS — L02.92 BOIL: ICD-10-CM

## 2024-02-14 DIAGNOSIS — Z11.3 SCREENING FOR VENEREAL DISEASE: Primary | ICD-10-CM

## 2024-02-14 PROCEDURE — 3077F SYST BP >= 140 MM HG: CPT | Performed by: OBSTETRICS & GYNECOLOGY

## 2024-02-14 PROCEDURE — 99213 OFFICE O/P EST LOW 20 MIN: CPT | Performed by: OBSTETRICS & GYNECOLOGY

## 2024-02-14 PROCEDURE — 3080F DIAST BP >= 90 MM HG: CPT | Performed by: OBSTETRICS & GYNECOLOGY

## 2024-02-15 ENCOUNTER — TELEPHONE (OUTPATIENT)
Age: 39
End: 2024-02-15

## 2024-02-15 DIAGNOSIS — N89.8 VAGINAL ITCHING: ICD-10-CM

## 2024-02-15 DIAGNOSIS — L02.92 BOIL: Primary | ICD-10-CM

## 2024-02-15 RX ORDER — CLINDAMYCIN PHOSPHATE 20 MG/G
CREAM VAGINAL
Qty: 40 G | Refills: 1 | Status: SHIPPED | OUTPATIENT
Start: 2024-02-15 | End: 2024-02-22

## 2024-02-15 RX ORDER — FLUCONAZOLE 150 MG/1
150 TABLET ORAL ONCE
Qty: 1 TABLET | Refills: 0 | Status: SHIPPED | OUTPATIENT
Start: 2024-02-15 | End: 2024-02-15

## 2024-02-15 NOTE — TELEPHONE ENCOUNTER
Stated that she was to be given two medications by Dr Euceda and went to pick them up and they were not at the pharmacy. Will send to pharmacy after verification with provider.

## 2024-02-17 LAB
A VAGINAE DNA VAG QL NAA+PROBE: ABNORMAL SCORE
BVAB2 DNA VAG QL NAA+PROBE: ABNORMAL SCORE
C ALBICANS DNA VAG QL NAA+PROBE: POSITIVE
C GLABRATA DNA VAG QL NAA+PROBE: NEGATIVE
C TRACH DNA VAG QL NAA+PROBE: NEGATIVE
MEGA1 DNA VAG QL NAA+PROBE: ABNORMAL SCORE
N GONORRHOEA DNA VAG QL NAA+PROBE: NEGATIVE
T VAGINALIS DNA VAG QL NAA+PROBE: NEGATIVE

## 2024-02-20 DIAGNOSIS — Z30.431 IUD CHECK UP: Primary | ICD-10-CM

## 2024-02-21 DIAGNOSIS — N92.6 IRREGULAR BLEEDING: ICD-10-CM

## 2024-02-27 RX ORDER — BACITRACIN ZINC AND POLYMYXIN B SULFATE 500; 1000 [USP'U]/G; [USP'U]/G
OINTMENT TOPICAL
Qty: 15 G | Refills: 1 | Status: SHIPPED | OUTPATIENT
Start: 2024-02-27 | End: 2024-03-05

## 2024-02-28 NOTE — PROGRESS NOTES
PE:  Constitutional: General Appearance: healthy-appearing, well-nourished, well-developed, and well groomed.     Psychiatric: Orientation: to time, place, and person. Mood and Affect: normal mood and affect and appropriate and active and alert.     Abdomen: Auscultation/Inspection/Palpation: no tenderness and mass and non-distended. Hernia: none palpated.     Female Genitalia: Vulva: no masses, atrophy. Boil on labia    Bladder/Urethra: no urethral discharge or mass and normal meatus and bladder non distended.     Vagina no tenderness, erythema, cystocele, rectocele, + abnormal vaginal discharge.     Cervix: no discharge or cervical motion tenderness and grossly normal. IUD string    Uterus: mobile, non-tender, and no uterine prolapse.     Adnexa/Parametria: no adnexal tenderness.   1. Screening for venereal disease      2. Vaginal discharge    - Nuswab Vaginitis Plus (VG+) ([facility])  - terconazole (TERAZOL 7) 0.4 % vaginal cream; Place vaginally nightly.  Dispense: 45 g; Refill: 0    3. Boil    - bacitracin-polymyxin b (POLYSPORIN) 500-66760 UNIT/GM ointment; Apply topically 2 times daily.  Dispense: 15 g; Refill: 1    4. Breakthrough bleeding associated with intrauterine device (IUD)           Return for gyn.

## 2024-06-24 ENCOUNTER — TELEPHONE (OUTPATIENT)
Age: 39
End: 2024-06-24

## 2024-06-24 NOTE — TELEPHONE ENCOUNTER
Patient called complaining of a clear vaginal discharge off and on .  She is also c/o LUQ pain.  States she has a hernia.  Advised the patient she would need to see her PCP regarding the hernia for a referral to a surgeon.  She was offered an appointment with Dr Ramirez on 06/28//24 for her vaginal discharge but declined.  Advised her she may try [facility].

## 2024-07-16 ENCOUNTER — TELEPHONE (OUTPATIENT)
Age: 39
End: 2024-07-16

## 2024-07-16 DIAGNOSIS — B00.9 HSV (HERPES SIMPLEX VIRUS) INFECTION: ICD-10-CM

## 2024-07-16 RX ORDER — VALACYCLOVIR HYDROCHLORIDE 500 MG/1
500 TABLET, FILM COATED ORAL DAILY
Qty: 30 TABLET | Refills: 0 | Status: SHIPPED | OUTPATIENT
Start: 2024-07-16

## 2024-07-16 NOTE — TELEPHONE ENCOUNTER
Patient called requesting a refill on Valtrex.  Appointment for annual exam scheduled on 07/31/24.  Refill submitted.

## 2024-07-29 ENCOUNTER — OFFICE VISIT (OUTPATIENT)
Age: 39
End: 2024-07-29
Payer: COMMERCIAL

## 2024-07-29 VITALS
HEIGHT: 67 IN | SYSTOLIC BLOOD PRESSURE: 124 MMHG | DIASTOLIC BLOOD PRESSURE: 74 MMHG | WEIGHT: 223 LBS | BODY MASS INDEX: 35 KG/M2

## 2024-07-29 DIAGNOSIS — N94.89 SUPPRESSION OF MENSTRUATION: ICD-10-CM

## 2024-07-29 DIAGNOSIS — Z97.5 IUD (INTRAUTERINE DEVICE) IN PLACE: ICD-10-CM

## 2024-07-29 DIAGNOSIS — N89.8 VAGINAL DISCHARGE: ICD-10-CM

## 2024-07-29 DIAGNOSIS — Z11.3 SCREENING FOR VENEREAL DISEASE: ICD-10-CM

## 2024-07-29 DIAGNOSIS — Z12.4 PAP SMEAR FOR CERVICAL CANCER SCREENING: Primary | ICD-10-CM

## 2024-07-29 DIAGNOSIS — Z01.419 GYNECOLOGIC EXAM NORMAL: ICD-10-CM

## 2024-07-29 PROCEDURE — 3078F DIAST BP <80 MM HG: CPT | Performed by: OBSTETRICS & GYNECOLOGY

## 2024-07-29 PROCEDURE — 3074F SYST BP LT 130 MM HG: CPT | Performed by: OBSTETRICS & GYNECOLOGY

## 2024-07-29 PROCEDURE — 99395 PREV VISIT EST AGE 18-39: CPT | Performed by: OBSTETRICS & GYNECOLOGY

## 2024-07-29 ASSESSMENT — ENCOUNTER SYMPTOMS
GASTROINTESTINAL NEGATIVE: 1
RESPIRATORY NEGATIVE: 1

## 2024-07-29 NOTE — PROGRESS NOTES
Chief Complaint   Patient presents with    Annual Exam     Frequent vaginal infections since the insertion of the IUD. She has an umbilical hernia from past surgeries & is having discomfort/constipation. Has not seen a surgeon     /74 (Site: Left Upper Arm, Position: Sitting, Cuff Size: Large Adult)   Ht 1.702 m (5' 7\")   Wt 101.2 kg (223 lb)   BMI 34.93 kg/m²     
cervical cancer screening  -     PAP LB, Reflex HPV ASCUS ([id number]) ([facility])    Vaginal discharge  -     NuSwab VG Plus+Mycoplasmas,MARY ([facility])    Gynecologic exam normal    IUD (intrauterine device) in place    Suppression of menstruation    Screening for venereal disease  -     HIV 1/2 AG/AB, 4TH GENERATION,W RFLX CONFIRM  -     Hepatitis C Antibody  -     Hepatitis B Surface Antigen  -     RPR    Tried OTC products for yeast  Notes nipple sensitivity- DWP to try Vit. E capsules OTC    Plan: Questions addressed  Counseled re: diet, exercise, healthy lifestyle  Return for Annual  Rec annual mammogram  Rec: DEXA  Rec: US  Rec: Colonoscopy

## 2024-07-30 LAB
., LABCORP: NORMAL
CYTOLOGIST CVX/VAG CYTO: NORMAL
CYTOLOGY CVX/VAG DOC CYTO: NORMAL
CYTOLOGY CVX/VAG DOC THIN PREP: NORMAL
DX ICD CODE: NORMAL
HBV SURFACE AG SERPL QL IA: NEGATIVE
HCV IGG SERPL QL IA: NON REACTIVE
HIV 1+2 AB+HIV1 P24 AG SERPL QL IA: NON REACTIVE
Lab: NORMAL
OTHER STN SPEC: NORMAL
RPR SER QL: NON REACTIVE
STAT OF ADQ CVX/VAG CYTO-IMP: NORMAL

## 2024-07-31 ENCOUNTER — TELEPHONE (OUTPATIENT)
Age: 39
End: 2024-07-31

## 2024-07-31 NOTE — TELEPHONE ENCOUNTER
Patient is questioning not being able to see her STD results for Gonorrhea, Chlamydia and Trich.  Advised her they are not available yet but will result in her Mychart at the same time we receive them.

## 2024-08-01 LAB
A VAGINAE DNA VAG QL NAA+PROBE: ABNORMAL SCORE
BVAB2 DNA VAG QL NAA+PROBE: ABNORMAL SCORE
C ALBICANS DNA VAG QL NAA+PROBE: NEGATIVE
C GLABRATA DNA VAG QL NAA+PROBE: NEGATIVE
C TRACH DNA SPEC QL NAA+PROBE: NEGATIVE
M GENITALIUM DNA SPEC QL NAA+PROBE: NEGATIVE
M HOMINIS DNA SPEC QL NAA+PROBE: NEGATIVE
MEGA1 DNA VAG QL NAA+PROBE: ABNORMAL SCORE
N GONORRHOEA DNA VAG QL NAA+PROBE: NEGATIVE
T VAGINALIS DNA VAG QL NAA+PROBE: NEGATIVE
UREAPLASMA DNA SPEC QL NAA+PROBE: POSITIVE

## 2024-08-05 ENCOUNTER — TELEPHONE (OUTPATIENT)
Age: 39
End: 2024-08-05

## 2024-08-05 RX ORDER — AZITHROMYCIN 250 MG/1
TABLET, FILM COATED ORAL
Qty: 6 TABLET | Refills: 0 | Status: SHIPPED | OUTPATIENT
Start: 2024-08-05

## 2024-08-05 NOTE — TELEPHONE ENCOUNTER
Patient contacted the office reports she spoke with someone on Friday and they advised they would be sending azithromycin to the pharmacy but no prescription has been sent in. She reports it was for lab results.  Please review and advise.  [facility] on [address] if after 130pm.

## 2024-08-06 DIAGNOSIS — B37.31 CANDIDA VAGINITIS: ICD-10-CM

## 2024-08-06 DIAGNOSIS — A49.3 NGU DUE TO UREAPLASMA UREALYTICUM: Primary | ICD-10-CM

## 2024-08-06 DIAGNOSIS — N34.1 NGU DUE TO UREAPLASMA UREALYTICUM: Primary | ICD-10-CM

## 2024-08-06 RX ORDER — AZITHROMYCIN 500 MG/1
500 TABLET, FILM COATED ORAL DAILY
Qty: 6 TABLET | Refills: 0 | Status: SHIPPED | OUTPATIENT
Start: 2024-08-06 | End: 2024-08-12

## 2024-08-06 RX ORDER — FLUCONAZOLE 150 MG/1
150 TABLET ORAL ONCE
Qty: 1 TABLET | Refills: 0 | Status: SHIPPED | OUTPATIENT
Start: 2024-08-06 | End: 2024-08-06

## 2024-10-29 DIAGNOSIS — B00.9 HSV (HERPES SIMPLEX VIRUS) INFECTION: ICD-10-CM

## 2024-10-30 RX ORDER — VALACYCLOVIR HYDROCHLORIDE 500 MG/1
500 TABLET, FILM COATED ORAL DAILY
Qty: 30 TABLET | Refills: 2 | Status: SHIPPED | OUTPATIENT
Start: 2024-10-30

## 2025-05-22 ENCOUNTER — TELEPHONE (OUTPATIENT)
Age: 40
End: 2025-05-22

## 2025-05-22 DIAGNOSIS — N93.8 DUB (DYSFUNCTIONAL UTERINE BLEEDING): Primary | ICD-10-CM

## 2025-05-22 RX ORDER — NORGESTIMATE AND ETHINYL ESTRADIOL 0.25-0.035
1 KIT ORAL DAILY
Qty: 1 PACKET | Refills: 0 | Status: SHIPPED | OUTPATIENT
Start: 2025-05-22

## 2025-06-25 ENCOUNTER — OFFICE VISIT (OUTPATIENT)
Age: 40
End: 2025-06-25
Payer: COMMERCIAL

## 2025-06-25 VITALS
SYSTOLIC BLOOD PRESSURE: 114 MMHG | WEIGHT: 237.5 LBS | BODY MASS INDEX: 37.28 KG/M2 | HEIGHT: 67 IN | DIASTOLIC BLOOD PRESSURE: 74 MMHG

## 2025-06-25 DIAGNOSIS — N89.8 VAGINAL DISCHARGE: Primary | ICD-10-CM

## 2025-06-25 DIAGNOSIS — N64.52 DISCHARGE FROM LEFT NIPPLE: ICD-10-CM

## 2025-06-25 PROCEDURE — 3074F SYST BP LT 130 MM HG: CPT | Performed by: OBSTETRICS & GYNECOLOGY

## 2025-06-25 PROCEDURE — 99214 OFFICE O/P EST MOD 30 MIN: CPT | Performed by: OBSTETRICS & GYNECOLOGY

## 2025-06-25 PROCEDURE — 3078F DIAST BP <80 MM HG: CPT | Performed by: OBSTETRICS & GYNECOLOGY

## 2025-06-25 RX ORDER — CLINDAMYCIN HYDROCHLORIDE 300 MG/1
300 CAPSULE ORAL 2 TIMES DAILY
Qty: 14 CAPSULE | Refills: 0 | Status: SHIPPED | OUTPATIENT
Start: 2025-06-25 | End: 2025-07-02

## 2025-06-25 RX ORDER — AZITHROMYCIN 500 MG/1
500 TABLET, FILM COATED ORAL DAILY
Qty: 7 TABLET | Refills: 0 | Status: SHIPPED | OUTPATIENT
Start: 2025-06-25 | End: 2025-07-02

## 2025-06-25 RX ORDER — FLUCONAZOLE 150 MG/1
150 TABLET ORAL ONCE
Qty: 1 TABLET | Refills: 0 | Status: SHIPPED | OUTPATIENT
Start: 2025-06-25 | End: 2025-06-25

## 2025-06-25 ASSESSMENT — ENCOUNTER SYMPTOMS
GASTROINTESTINAL NEGATIVE: 1
RESPIRATORY NEGATIVE: 1

## 2025-06-25 NOTE — PROGRESS NOTES
Chief Complaint   Patient presents with    Other     Increased vaginal moisture & external itching near the clitoris. Some odor upon urination     /74 (BP Site: Left Upper Arm, Patient Position: Sitting, BP Cuff Size: Large Adult)   Ht 1.702 m (5' 7\")   Wt 107.7 kg (237 lb 8 oz)   BMI 37.20 kg/m²

## 2025-06-25 NOTE — PROGRESS NOTES
Simi Anaya is a No obstetric history on file., 39 y.o. female   No LMP recorded. (Menstrual status: IUD).    She presents for her problem    She is having some vaginal d/c, urinary sxs and an odor.  Also notes having pain and discharge from the left breast- medial nipple area( hx of piercing- many years ago)- this has been going on and off x 2 yrs    Menstrual status:  Cycles are minimal to none with hormone containing IUS.    Flow: absent.      She does not have dysmenorrhea.      Medical conditions:  Since her last annual GYN exam about one year ago, she has not the following changes in her health history: none.     Mammogram History:    JACE Results (most recent):  @[facility]([id number]:1)@     DEXA Results (most recent):  @[facility]([id number]:1)@       Past Medical History:   Diagnosis Date    Constipation 07/29/2024    Gestational HTN     History of anemia     History of seasonal allergies     Umbilical hernia 07/29/2024    Vaginitis 07/29/2024     Past Surgical History:   Procedure Laterality Date    MYOMECTOMY      WISDOM TOOTH EXTRACTION N/A        Prior to Admission medications    Medication Sig Start Date End Date Taking? Authorizing Provider   clindamycin (CLEOCIN) 300 MG capsule Take 1 capsule by mouth 2 times daily for 7 days 6/25/25 7/2/25 Yes Edi Low MD   fluconazole (DIFLUCAN) 150 MG tablet Take 1 tablet by mouth once for 1 dose Take after finishing course of antibiotics 6/25/25 6/25/25 Yes Edi Low MD   azithromycin (ZITHROMAX) 500 MG tablet Take 1 tablet by mouth daily for 7 days 6/25/25 7/2/25 Yes Edi Low MD   valACYclovir (VALTREX) 500 MG tablet TAKE 1 TABLET BY MOUTH DAILY 10/30/24  Yes Shakira Euceda MD   albuterol sulfate HFA (PROVENTIL;VENTOLIN;PROAIR) 108 (90 Base) MCG/ACT inhaler 2 puff as needed Inhalation every 4 hrs for 10 days 8/3/22  Yes ProviderNik MD WEGOVY 0.25 MG/0.5ML SOAJ SC injection INJECT 1 SYRINGE SUBCUTANEOUSLY ONCE A WEEK

## 2025-06-27 ENCOUNTER — RESULTS FOLLOW-UP (OUTPATIENT)
Age: 40
End: 2025-06-27

## 2025-06-27 LAB
A VAGINAE DNA VAG QL NAA+PROBE: ABNORMAL SCORE
BVAB2 DNA VAG QL NAA+PROBE: ABNORMAL SCORE
C ALBICANS DNA VAG QL NAA+PROBE: NEGATIVE
C GLABRATA DNA VAG QL NAA+PROBE: NEGATIVE
C TRACH DNA SPEC QL NAA+PROBE: NEGATIVE
M GENITALIUM DNA SPEC QL NAA+PROBE: POSITIVE
M HOMINIS DNA SPEC QL NAA+PROBE: NEGATIVE
MEGA1 DNA VAG QL NAA+PROBE: ABNORMAL SCORE
N GONORRHOEA DNA VAG QL NAA+PROBE: NEGATIVE
T VAGINALIS DNA VAG QL NAA+PROBE: NEGATIVE
UREAPLASMA DNA SPEC QL NAA+PROBE: POSITIVE

## 2025-07-15 ENCOUNTER — TELEPHONE (OUTPATIENT)
Age: 40
End: 2025-07-15

## 2025-07-15 DIAGNOSIS — N89.8 VAGINAL DISCHARGE: Primary | ICD-10-CM

## 2025-07-15 DIAGNOSIS — N89.8 VAGINAL ODOR: ICD-10-CM

## 2025-07-15 RX ORDER — METRONIDAZOLE 7.5 MG/G
1 GEL VAGINAL DAILY
Qty: 70 G | Refills: 0 | Status: SHIPPED | OUTPATIENT
Start: 2025-07-15 | End: 2025-07-20

## 2025-07-15 NOTE — TELEPHONE ENCOUNTER
Returned the patient's call after speaking with Dr Low regarding the patient's complaint of a clear vaginal discharge with a slight odor.  She was requesting a prescription for Metrogel.  Per Dr Low' verbal order with readback, Metrogel prescription to be submitted to the patient's pharmacy and an applicator full per bedtime x 5 nights with no refills

## 2025-07-30 ENCOUNTER — OFFICE VISIT (OUTPATIENT)
Age: 40
End: 2025-07-30
Payer: COMMERCIAL

## 2025-07-30 VITALS
DIASTOLIC BLOOD PRESSURE: 70 MMHG | SYSTOLIC BLOOD PRESSURE: 130 MMHG | HEIGHT: 67 IN | WEIGHT: 236.5 LBS | BODY MASS INDEX: 37.12 KG/M2

## 2025-07-30 DIAGNOSIS — Z12.31 SCREENING MAMMOGRAM FOR BREAST CANCER: Primary | ICD-10-CM

## 2025-07-30 DIAGNOSIS — Z12.4 PAP SMEAR FOR CERVICAL CANCER SCREENING: ICD-10-CM

## 2025-07-30 DIAGNOSIS — Z11.3 SCREENING EXAMINATION FOR VENEREAL DISEASE: ICD-10-CM

## 2025-07-30 DIAGNOSIS — N89.8 VAGINAL IRRITATION: ICD-10-CM

## 2025-07-30 PROCEDURE — 99396 PREV VISIT EST AGE 40-64: CPT | Performed by: OBSTETRICS & GYNECOLOGY

## 2025-07-30 PROCEDURE — 3078F DIAST BP <80 MM HG: CPT | Performed by: OBSTETRICS & GYNECOLOGY

## 2025-07-30 PROCEDURE — 3075F SYST BP GE 130 - 139MM HG: CPT | Performed by: OBSTETRICS & GYNECOLOGY

## 2025-07-30 RX ORDER — FLUCONAZOLE 150 MG/1
150 TABLET ORAL
Qty: 2 TABLET | Refills: 0 | Status: SHIPPED | OUTPATIENT
Start: 2025-07-30 | End: 2025-08-05

## 2025-07-30 ASSESSMENT — ENCOUNTER SYMPTOMS
RESPIRATORY NEGATIVE: 1
GASTROINTESTINAL NEGATIVE: 1

## 2025-07-30 NOTE — PROGRESS NOTES
Simi Anaya is a No obstetric history on file., 40 y.o. female   No LMP recorded. (Menstrual status: IUD).    She presents for her annual    She is having some vaginal discharge sxs.      Menstrual status:  Cycles are minimal to none with hormone containing IUS.    Flow: absent.      She does not have dysmenorrhea.      Medical conditions:  Since her last annual GYN exam about one year ago, she has not the following changes in her health history: none.     Mammogram History:    JACE Results (most recent):  @[facility]([id number])@     DEXA Results (most recent):  @[facility]([id number]:1)@       Past Medical History:   Diagnosis Date    Constipation 07/29/2024    Gestational HTN     History of anemia     History of seasonal allergies     Umbilical hernia 07/29/2024    Vaginitis 07/29/2024     Past Surgical History:   Procedure Laterality Date    MYOMECTOMY      WISDOM TOOTH EXTRACTION N/A        Prior to Admission medications    Medication Sig Start Date End Date Taking? Authorizing Provider   fluconazole (DIFLUCAN) 150 MG tablet Take 1 tablet by mouth every 72 hours for 6 days 7/30/25 8/5/25 Yes Edi Low MD   valACYclovir (VALTREX) 500 MG tablet TAKE 1 TABLET BY MOUTH DAILY 10/30/24  Yes Shakira Euceda MD   albuterol sulfate HFA (PROVENTIL;VENTOLIN;PROAIR) 108 (90 Base) MCG/ACT inhaler 2 puff as needed Inhalation every 4 hrs for 10 days 8/3/22  Yes ProviderNik MD WEGOVY 0.25 MG/0.5ML SOAJ SC injection INJECT 1 SYRINGE SUBCUTANEOUSLY ONCE A WEEK 4/26/23  Yes ProviderNik MD   cetirizine (ZYRTEC) 10 MG tablet Take 1 tablet by mouth daily as needed   Yes ProviderNik MD   montelukast (SINGULAIR) 10 MG tablet Take 1 tablet by mouth every evening   Yes Nik Crespo MD   pantoprazole (PROTONIX) 40 MG tablet Take 1 tablet by mouth daily 7/22/21  Yes Nik Crespo MD       Allergies   Allergen Reactions    Hydrocodone Nausea Only          Tobacco History:

## 2025-07-30 NOTE — PROGRESS NOTES
Chief Complaint   Patient presents with    Annual Exam     Mammo-never     /70 (BP Site: Right Upper Arm, Patient Position: Sitting, BP Cuff Size: Large Adult)   Ht 1.702 m (5' 7\")   Wt 107.3 kg (236 lb 8 oz)   BMI 37.04 kg/m²

## 2025-08-02 LAB
., LABCORP: NORMAL
C TRACH RRNA CVX QL NAA+PROBE: NEGATIVE
CYTOLOGIST CVX/VAG CYTO: NORMAL
CYTOLOGY CVX/VAG DOC CYTO: NORMAL
CYTOLOGY CVX/VAG DOC THIN PREP: NORMAL
DX ICD CODE: NORMAL
N GONORRHOEA RRNA CVX QL NAA+PROBE: NEGATIVE
OTHER STN SPEC: NORMAL
SERVICE CMNT-IMP: NORMAL
STAT OF ADQ CVX/VAG CYTO-IMP: NORMAL

## (undated) DEVICE — 3M™ TEGADERM™ TRANSPARENT FILM DRESSING FRAME STYLE, 1624W, 2-3/8 IN X 2-3/4 IN (6 CM X 7 CM), 100/CT 4CT/CASE: Brand: 3M™ TEGADERM™

## (undated) DEVICE — SUTURE VCRL SZ 2-0 L36IN ABSRB UD L36MM CT-1 1/2 CIR J945H

## (undated) DEVICE — CATHETER,URETHRAL,REDRUBBER,STRL,16FR: Brand: MEDLINE

## (undated) DEVICE — CANISTER, RIGID, 3000CC: Brand: MEDLINE INDUSTRIES, INC.

## (undated) DEVICE — SURGICAL PROCEDURE PACK TISS 3X5 IN ABSORBABLE SEPRAFILM

## (undated) DEVICE — CURVED, LARGE JAW, OPEN SEALER/DIVIDER NANO-COATED: Brand: LIGASURE IMPACT

## (undated) DEVICE — SUTURE VCRL SZ 3-0 L27IN ABSRB UD L26MM SH 1/2 CIR J416H

## (undated) DEVICE — MAGNETIC INSTR DRAPE 20X16: Brand: MEDLINE INDUSTRIES, INC.

## (undated) DEVICE — TELFA ADHESIVE ISLAND DRESSING: Brand: TELFA

## (undated) DEVICE — SUT ETHLN 3-0 18IN PS2 BLK --

## (undated) DEVICE — DRESSING FOAM DISK DIA1IN H 7MM HYDRPHLC CHG IMPREG IN SL

## (undated) DEVICE — 3M™ MEDIPORE™ H SOFT CLOTH TAPE SHORT ROLL TAPE 6INCHES X 2 YARDS 16 ROLLS/CASE 2866S: Brand: 3M™ MEDIPORE™

## (undated) DEVICE — STRIP,CLOSURE,WOUND,MEDI-STRIP,1/2X4: Brand: MEDLINE

## (undated) DEVICE — STERILE POLYISOPRENE POWDER-FREE SURGICAL GLOVES: Brand: PROTEXIS

## (undated) DEVICE — LAPAROTOMY-SFMC: Brand: MEDLINE INDUSTRIES, INC.

## (undated) DEVICE — SOL IRRIGATION INJ NACL 0.9% 500ML BTL

## (undated) DEVICE — TROCAR: Brand: KII® SLEEVE

## (undated) DEVICE — TOTAL TRAY, 16FR 10ML SIL FOLEY, URN: Brand: MEDLINE

## (undated) DEVICE — SUTURE STRATAFIX SYMMETRIC SZ 1 L18IN ABSRB VLT CT1 L36CM SXPP1A404

## (undated) DEVICE — STERILE POLYISOPRENE POWDER-FREE SURGICAL GLOVES WITH EMOLLIENT COATING: Brand: PROTEXIS

## (undated) DEVICE — BLADE ASSEMB CLP HAIR FINE --

## (undated) DEVICE — PREP PAD BNS: Brand: CONVERTORS

## (undated) DEVICE — SUTURE SZ 0 27IN 5/8 CIR UR-6  TAPER PT VIOLET ABSRB VICRYL J603H

## (undated) DEVICE — CLICKLINE SCISSORS INSERT: Brand: CLICKLINE

## (undated) DEVICE — STAPLER INT DIA29MM CLS STPL H1.5-2.2MM OPN LEG L5.2MM 26

## (undated) DEVICE — LARGE, DISPOSABLE ALEXIS O C-SECTION PROTECTOR - RETRACTOR: Brand: ALEXIS ® O C-SECTION PROTECTOR - RETRACTOR

## (undated) DEVICE — STERILE-Z MAYO STAND COVERS CLEAR POLYETHYLENE STERILE UNIVERSAL FIT 20 PER CASE: Brand: STERILE-Z

## (undated) DEVICE — SUTURE VCRL SZ 3-0 L18IN ABSRB UD L26MM SH 1/2 CIR J864D

## (undated) DEVICE — TRAY PREP DRY W/ PREM GLV 2 APPL 6 SPNG 2 UNDPD 1 OVERWRAP

## (undated) DEVICE — DEVICE TRNSF SPIK STL 2008S] MICROTEK MEDICAL INC]

## (undated) DEVICE — BLADE ELECTRODE: Brand: EDGE

## (undated) DEVICE — SYR 10ML LUER LOK 1/5ML GRAD --

## (undated) DEVICE — DRAIN SURG 19FR 0.25IN SIL RND W/ TRCR INDIC DOT RADPQ FULL

## (undated) DEVICE — DRAPE FLD WRM W44XL66IN C6L FOR INTRATEMP SYS THERMABASIN

## (undated) DEVICE — TOWEL SURG W17XL27IN STD BLU COT NONFENESTRATED PREWASHED

## (undated) DEVICE — SUTURE VLOC ABSORBABLE VL12V 30 VLOCM0614

## (undated) DEVICE — SUTURE MCRYL SZ 3-0 L27IN ABSRB UD L24MM PS-1 3/8 CIR PRIM Y936H

## (undated) DEVICE — RESERVOIR,SUCTION,100CC,SILICONE: Brand: MEDLINE

## (undated) DEVICE — LAPAROSCOPIC TROCAR SLEEVE/SINGLE USE: Brand: KII® OPTICAL ACCESS SYSTEM

## (undated) DEVICE — SUTURE PDS II SZ 2-0 L27IN ABSRB UD CT-1 L36MM 1/2 CIR Z259H

## (undated) DEVICE — SUT PROL 2-0 30IN SH BLU --

## (undated) DEVICE — INTEGRA® JARIT® ROCHESTER-PEAN FORCEPS, 6-1/2" CURVED, MIRROR FINISH: Brand: INTEGRA® JARIT®

## (undated) DEVICE — SUTURE PERMAHAND SZ 3-0 L18IN NONABSORBABLE BLK L26MM SH C013D

## (undated) DEVICE — SUTURE PLN GUT SZ 2-0 L27IN ABSRB YELLOWISH TAN L70MM XLH 53T

## (undated) DEVICE — 3M™ TEGADERM™ TRANSPARENT FILM DRESSING FRAME STYLE, 1626W, 4 IN X 4-3/4 IN (10 CM X 12 CM), 50/CT 4CT/CASE: Brand: 3M™ TEGADERM™

## (undated) DEVICE — SOLUTION IRRIG 1000ML 0.9% SOD CHL USP POUR PLAS BTL

## (undated) DEVICE — NEEDLE HYPO 22GA L1.5IN BLK S STL HUB POLYPR SHLD REG BVL

## (undated) DEVICE — STAPLER INT L34CM 60MM LNG ENDOSCP ARTC PWR + ECHELON FLX

## (undated) DEVICE — PAD,NON-ADHERENT,3X8,STERILE,LF,1/PK: Brand: MEDLINE

## (undated) DEVICE — MASTISOL ADHESIVE LIQ 2/3ML

## (undated) DEVICE — SUTURE MCRYL SZ 4-0 L27IN ABSRB UD L19MM PS-2 1/2 CIR PRIM Y426H

## (undated) DEVICE — GENERAL LAPAROSCOPY-SFMC: Brand: MEDLINE INDUSTRIES, INC.

## (undated) DEVICE — RELOAD STPL L60MM H1.5-3.6MM REG TISS BLU GRIPPING SURF B

## (undated) DEVICE — SUTURE ABSRB BRAID COAT UD CT NO 1 36IN VCRL J959H

## (undated) DEVICE — SUTURE ETHLN SZ 2-0 L18IN NONABSORBABLE BLK L26MM FS 3/8 664G

## (undated) DEVICE — SUTURE PERMAHAND SZ 3-0 L30IN NONABSORBABLE BLK SILK BRAID A304H